# Patient Record
Sex: FEMALE | Race: WHITE | Employment: FULL TIME | URBAN - METROPOLITAN AREA
[De-identification: names, ages, dates, MRNs, and addresses within clinical notes are randomized per-mention and may not be internally consistent; named-entity substitution may affect disease eponyms.]

---

## 2017-03-06 RX ORDER — ZOLPIDEM TARTRATE 10 MG/1
10 TABLET ORAL NIGHTLY PRN
Qty: 90 TABLET | Refills: 1 | Status: CANCELLED | OUTPATIENT
Start: 2017-03-06

## 2017-03-17 RX ORDER — ZOLPIDEM TARTRATE 10 MG/1
10 TABLET ORAL NIGHTLY PRN
Qty: 90 TABLET | Refills: 1 | Status: SHIPPED | OUTPATIENT
Start: 2017-03-17 | End: 2018-06-25 | Stop reason: SDUPTHER

## 2017-08-07 RX ORDER — LEVOTHYROXINE SODIUM 137 UG/1
TABLET ORAL
Qty: 90 TABLET | Refills: 1 | Status: SHIPPED | OUTPATIENT
Start: 2017-08-07 | End: 2018-02-03 | Stop reason: SDUPTHER

## 2017-09-22 ENCOUNTER — OFFICE VISIT (OUTPATIENT)
Dept: INTERNAL MEDICINE CLINIC | Age: 47
End: 2017-09-22

## 2017-09-22 VITALS
HEART RATE: 76 BPM | BODY MASS INDEX: 24.04 KG/M2 | WEIGHT: 153.2 LBS | RESPIRATION RATE: 16 BRPM | DIASTOLIC BLOOD PRESSURE: 60 MMHG | HEIGHT: 67 IN | SYSTOLIC BLOOD PRESSURE: 100 MMHG

## 2017-09-22 DIAGNOSIS — E03.4 HYPOTHYROIDISM DUE TO ACQUIRED ATROPHY OF THYROID: ICD-10-CM

## 2017-09-22 DIAGNOSIS — E78.00 PURE HYPERCHOLESTEROLEMIA: ICD-10-CM

## 2017-09-22 DIAGNOSIS — K90.0 CELIAC SPRUE: ICD-10-CM

## 2017-09-22 DIAGNOSIS — Z00.00 ANNUAL PHYSICAL EXAM: Primary | ICD-10-CM

## 2017-09-22 DIAGNOSIS — Z23 NEED FOR INFLUENZA VACCINATION: ICD-10-CM

## 2017-09-22 LAB
A/G RATIO: 1.4 (ref 1.1–2.2)
ALBUMIN SERPL-MCNC: 4.1 G/DL (ref 3.4–5)
ALP BLD-CCNC: 104 U/L (ref 40–129)
ALT SERPL-CCNC: 36 U/L (ref 10–40)
ANION GAP SERPL CALCULATED.3IONS-SCNC: 13 MMOL/L (ref 3–16)
AST SERPL-CCNC: 24 U/L (ref 15–37)
BASOPHILS ABSOLUTE: 0 K/UL (ref 0–0.2)
BASOPHILS RELATIVE PERCENT: 0.4 %
BILIRUB SERPL-MCNC: 0.3 MG/DL (ref 0–1)
BUN BLDV-MCNC: 12 MG/DL (ref 7–20)
CALCIUM SERPL-MCNC: 9 MG/DL (ref 8.3–10.6)
CHLORIDE BLD-SCNC: 99 MMOL/L (ref 99–110)
CHOLESTEROL, TOTAL: 231 MG/DL (ref 0–199)
CO2: 27 MMOL/L (ref 21–32)
CREAT SERPL-MCNC: <0.5 MG/DL (ref 0.6–1.1)
EOSINOPHILS ABSOLUTE: 0.1 K/UL (ref 0–0.6)
EOSINOPHILS RELATIVE PERCENT: 1.6 %
FERRITIN: 143.3 NG/ML (ref 15–150)
GFR AFRICAN AMERICAN: >60
GFR NON-AFRICAN AMERICAN: >60
GLOBULIN: 2.9 G/DL
GLUCOSE BLD-MCNC: 83 MG/DL (ref 70–99)
HCT VFR BLD CALC: 40.1 % (ref 36–48)
HDLC SERPL-MCNC: 58 MG/DL (ref 40–60)
HEMOGLOBIN: 13.6 G/DL (ref 12–16)
IRON SATURATION: 33 % (ref 15–50)
IRON: 104 UG/DL (ref 37–145)
LDL CHOLESTEROL CALCULATED: 141 MG/DL
LYMPHOCYTES ABSOLUTE: 1.8 K/UL (ref 1–5.1)
LYMPHOCYTES RELATIVE PERCENT: 21.1 %
MCH RBC QN AUTO: 31.8 PG (ref 26–34)
MCHC RBC AUTO-ENTMCNC: 34 G/DL (ref 31–36)
MCV RBC AUTO: 93.5 FL (ref 80–100)
MONOCYTES ABSOLUTE: 0.5 K/UL (ref 0–1.3)
MONOCYTES RELATIVE PERCENT: 6 %
NEUTROPHILS ABSOLUTE: 5.9 K/UL (ref 1.7–7.7)
NEUTROPHILS RELATIVE PERCENT: 70.9 %
PDW BLD-RTO: 13.3 % (ref 12.4–15.4)
PLATELET # BLD: 241 K/UL (ref 135–450)
PMV BLD AUTO: 7.9 FL (ref 5–10.5)
POTASSIUM SERPL-SCNC: 4.3 MMOL/L (ref 3.5–5.1)
RBC # BLD: 4.28 M/UL (ref 4–5.2)
SODIUM BLD-SCNC: 139 MMOL/L (ref 136–145)
TOTAL IRON BINDING CAPACITY: 319 UG/DL (ref 260–445)
TOTAL PROTEIN: 7 G/DL (ref 6.4–8.2)
TRIGL SERPL-MCNC: 162 MG/DL (ref 0–150)
TSH SERPL DL<=0.05 MIU/L-ACNC: 3.4 UIU/ML (ref 0.27–4.2)
VITAMIN D 25-HYDROXY: 31.8 NG/ML
VLDLC SERPL CALC-MCNC: 32 MG/DL
WBC # BLD: 8.4 K/UL (ref 4–11)

## 2017-09-22 PROCEDURE — 99396 PREV VISIT EST AGE 40-64: CPT | Performed by: INTERNAL MEDICINE

## 2017-09-22 PROCEDURE — 90471 IMMUNIZATION ADMIN: CPT | Performed by: INTERNAL MEDICINE

## 2017-09-22 PROCEDURE — 90686 IIV4 VACC NO PRSV 0.5 ML IM: CPT | Performed by: INTERNAL MEDICINE

## 2017-09-22 ASSESSMENT — PATIENT HEALTH QUESTIONNAIRE - PHQ9
2. FEELING DOWN, DEPRESSED OR HOPELESS: 0
SUM OF ALL RESPONSES TO PHQ9 QUESTIONS 1 & 2: 0
SUM OF ALL RESPONSES TO PHQ QUESTIONS 1-9: 0
1. LITTLE INTEREST OR PLEASURE IN DOING THINGS: 0

## 2017-09-22 ASSESSMENT — ENCOUNTER SYMPTOMS
BLOOD IN STOOL: 0
RESPIRATORY NEGATIVE: 1
WHEEZING: 0
SHORTNESS OF BREATH: 0

## 2017-09-25 LAB — CELIAC PANEL: 19 UNITS (ref 0–19)

## 2018-02-03 RX ORDER — LEVOTHYROXINE SODIUM 137 UG/1
TABLET ORAL
Qty: 90 TABLET | Refills: 1 | Status: SHIPPED | OUTPATIENT
Start: 2018-02-03 | End: 2018-08-02 | Stop reason: SDUPTHER

## 2018-06-25 DIAGNOSIS — G47.00 INSOMNIA, UNSPECIFIED TYPE: Primary | ICD-10-CM

## 2018-06-25 RX ORDER — ZOLPIDEM TARTRATE 10 MG/1
10 TABLET ORAL NIGHTLY PRN
Qty: 90 TABLET | Refills: 0 | Status: SHIPPED | OUTPATIENT
Start: 2018-06-25 | End: 2018-06-26 | Stop reason: SDUPTHER

## 2018-06-26 DIAGNOSIS — G47.00 INSOMNIA, UNSPECIFIED TYPE: ICD-10-CM

## 2018-06-26 RX ORDER — ZOLPIDEM TARTRATE 10 MG/1
TABLET ORAL
Qty: 14 TABLET | Refills: 0 | Status: SHIPPED | OUTPATIENT
Start: 2018-06-26 | End: 2018-10-11 | Stop reason: SDUPTHER

## 2018-06-26 RX ORDER — ZOLPIDEM TARTRATE 10 MG/1
10 TABLET ORAL NIGHTLY PRN
Qty: 14 TABLET | Refills: 0 | Status: CANCELLED | OUTPATIENT
Start: 2018-06-26

## 2018-07-18 RX ORDER — CITALOPRAM 20 MG/1
TABLET ORAL
Qty: 90 TABLET | Refills: 5 | Status: SHIPPED | OUTPATIENT
Start: 2018-07-18 | End: 2019-10-13 | Stop reason: SDUPTHER

## 2018-08-02 RX ORDER — LEVOTHYROXINE SODIUM 137 UG/1
TABLET ORAL
Qty: 90 TABLET | Refills: 1 | Status: SHIPPED | OUTPATIENT
Start: 2018-08-02 | End: 2018-09-24 | Stop reason: SDUPTHER

## 2018-09-21 ENCOUNTER — OFFICE VISIT (OUTPATIENT)
Dept: INTERNAL MEDICINE CLINIC | Age: 48
End: 2018-09-21

## 2018-09-21 VITALS
RESPIRATION RATE: 16 BRPM | SYSTOLIC BLOOD PRESSURE: 102 MMHG | DIASTOLIC BLOOD PRESSURE: 60 MMHG | HEART RATE: 98 BPM | BODY MASS INDEX: 21.06 KG/M2 | HEIGHT: 67 IN | WEIGHT: 134.2 LBS

## 2018-09-21 DIAGNOSIS — E03.4 HYPOTHYROIDISM DUE TO ACQUIRED ATROPHY OF THYROID: ICD-10-CM

## 2018-09-21 DIAGNOSIS — Z23 NEED FOR INFLUENZA VACCINATION: ICD-10-CM

## 2018-09-21 DIAGNOSIS — F41.9 ANXIETY: ICD-10-CM

## 2018-09-21 DIAGNOSIS — K90.0 CELIAC SPRUE: ICD-10-CM

## 2018-09-21 DIAGNOSIS — Z00.00 ANNUAL PHYSICAL EXAM: Primary | ICD-10-CM

## 2018-09-21 LAB
A/G RATIO: 1.9 (ref 1.1–2.2)
ALBUMIN SERPL-MCNC: 4.5 G/DL (ref 3.4–5)
ALP BLD-CCNC: 110 U/L (ref 40–129)
ALT SERPL-CCNC: 13 U/L (ref 10–40)
ANION GAP SERPL CALCULATED.3IONS-SCNC: 13 MMOL/L (ref 3–16)
AST SERPL-CCNC: 15 U/L (ref 15–37)
BASOPHILS ABSOLUTE: 0 K/UL (ref 0–0.2)
BASOPHILS RELATIVE PERCENT: 0.4 %
BILIRUB SERPL-MCNC: 0.3 MG/DL (ref 0–1)
BUN BLDV-MCNC: 16 MG/DL (ref 7–20)
CALCIUM SERPL-MCNC: 9.4 MG/DL (ref 8.3–10.6)
CHLORIDE BLD-SCNC: 99 MMOL/L (ref 99–110)
CHOLESTEROL, TOTAL: 228 MG/DL (ref 0–199)
CO2: 28 MMOL/L (ref 21–32)
CREAT SERPL-MCNC: <0.5 MG/DL (ref 0.6–1.1)
EOSINOPHILS ABSOLUTE: 0.2 K/UL (ref 0–0.6)
EOSINOPHILS RELATIVE PERCENT: 2.4 %
GFR AFRICAN AMERICAN: >60
GFR NON-AFRICAN AMERICAN: >60
GLOBULIN: 2.4 G/DL
GLUCOSE BLD-MCNC: 85 MG/DL (ref 70–99)
HCT VFR BLD CALC: 39.6 % (ref 36–48)
HDLC SERPL-MCNC: 56 MG/DL (ref 40–60)
HEMOGLOBIN: 13.4 G/DL (ref 12–16)
LDL CHOLESTEROL CALCULATED: 135 MG/DL
LYMPHOCYTES ABSOLUTE: 1.8 K/UL (ref 1–5.1)
LYMPHOCYTES RELATIVE PERCENT: 27.2 %
MCH RBC QN AUTO: 31.8 PG (ref 26–34)
MCHC RBC AUTO-ENTMCNC: 33.8 G/DL (ref 31–36)
MCV RBC AUTO: 94.3 FL (ref 80–100)
MONOCYTES ABSOLUTE: 0.4 K/UL (ref 0–1.3)
MONOCYTES RELATIVE PERCENT: 6.4 %
NEUTROPHILS ABSOLUTE: 4.1 K/UL (ref 1.7–7.7)
NEUTROPHILS RELATIVE PERCENT: 63.6 %
PDW BLD-RTO: 13 % (ref 12.4–15.4)
PLATELET # BLD: 236 K/UL (ref 135–450)
PMV BLD AUTO: 8 FL (ref 5–10.5)
POTASSIUM SERPL-SCNC: 4.2 MMOL/L (ref 3.5–5.1)
RBC # BLD: 4.2 M/UL (ref 4–5.2)
SODIUM BLD-SCNC: 140 MMOL/L (ref 136–145)
TOTAL PROTEIN: 6.9 G/DL (ref 6.4–8.2)
TRIGL SERPL-MCNC: 186 MG/DL (ref 0–150)
TSH SERPL DL<=0.05 MIU/L-ACNC: 5.47 UIU/ML (ref 0.27–4.2)
VITAMIN B-12: 506 PG/ML (ref 211–911)
VITAMIN D 25-HYDROXY: 25.3 NG/ML
VLDLC SERPL CALC-MCNC: 37 MG/DL
WBC # BLD: 6.4 K/UL (ref 4–11)

## 2018-09-21 PROCEDURE — 99396 PREV VISIT EST AGE 40-64: CPT | Performed by: INTERNAL MEDICINE

## 2018-09-21 PROCEDURE — 90682 RIV4 VACC RECOMBINANT DNA IM: CPT | Performed by: INTERNAL MEDICINE

## 2018-09-21 PROCEDURE — 90471 IMMUNIZATION ADMIN: CPT | Performed by: INTERNAL MEDICINE

## 2018-09-21 ASSESSMENT — ENCOUNTER SYMPTOMS
RESPIRATORY NEGATIVE: 1
GASTROINTESTINAL NEGATIVE: 1
SHORTNESS OF BREATH: 0
ABDOMINAL DISTENTION: 0
BLOOD IN STOOL: 0
WHEEZING: 0

## 2018-09-21 ASSESSMENT — PATIENT HEALTH QUESTIONNAIRE - PHQ9
2. FEELING DOWN, DEPRESSED OR HOPELESS: 0
SUM OF ALL RESPONSES TO PHQ QUESTIONS 1-9: 0
SUM OF ALL RESPONSES TO PHQ9 QUESTIONS 1 & 2: 0
1. LITTLE INTEREST OR PLEASURE IN DOING THINGS: 0
SUM OF ALL RESPONSES TO PHQ QUESTIONS 1-9: 0

## 2018-09-21 NOTE — PROGRESS NOTES
Vaccine Information Sheet, \"Influenza - Inactivated\"  given to Gato Doan, or parent/legal guardian of  Gato Doan and verbalized understanding. Patient responses:    Have you ever had a reaction to a flu vaccine? No  Are you able to eat eggs without adverse effects? Yes  Do you have any current illness? No  Have you ever had Guillian Starlight Syndrome? No    Flu vaccine given per order. Please see immunization tab.

## 2018-09-21 NOTE — PROGRESS NOTES
Subjective:      Patient ID: Marietta Laughlin is a 41 y.o. female. HPI  Here for an annual exam.  She has been doing well. Exercising regularly, walks and feels well with that. Sleep continues to be a problem. Taking ambien with some relief, still with early waking. She has lost 20 pounds since he last visit. Energy is ok. No changes in her hair or nails. Review of Systems   Constitutional: Negative for fatigue and unexpected weight change. Respiratory: Negative. Negative for shortness of breath and wheezing. Cardiovascular: Negative. Negative for chest pain and palpitations. Gastrointestinal: Negative. Negative for abdominal distention and blood in stool. Genitourinary: Negative. Negative for hematuria. Psychiatric/Behavioral: Negative for dysphoric mood. All other systems reviewed and are negative. Current Outpatient Prescriptions   Medication Sig Dispense Refill    levothyroxine (SYNTHROID) 137 MCG tablet TAKE 1 TABLET DAILY 90 tablet 1    citalopram (CELEXA) 20 MG tablet TAKE 1 TABLET DAILY 90 tablet 5    zolpidem (AMBIEN) 10 MG tablet One at night. 14 tablet 0    estradiol (ESTRACE) 2 MG tablet Take 2 mg by mouth daily       No current facility-administered medications for this visit.         Allergies:  Demerol      Past Medical History:   Diagnosis Date    Anemia     in the past    Celiac sprue     Hypothyroidism     Insomnia          Past Surgical History:   Procedure Laterality Date    ABDOMINOPLASTY  10/2010    APPENDECTOMY      at the timeof the hysterectomy    CARPAL TUNNEL RELEASE Right 13    CARPAL TUNNEL RELEASE Left 10/11/13     SECTION      x2    COLONOSCOPY  10/27/14    gallagher-moderate distal descending diverticulosis, repeat in 10 years    HYSTERECTOMY      total hysterectomy (family history of ovarian cancer)         Social History   Substance Use Topics    Smoking status: Former Smoker     Quit date: 2006    Smokeless tobacco:

## 2018-09-25 LAB — CELIAC PANEL: 12 UNITS (ref 0–19)

## 2019-10-30 ENCOUNTER — ANESTHESIA EVENT (OUTPATIENT)
Dept: ENDOSCOPY | Age: 49
End: 2019-10-30
Payer: COMMERCIAL

## 2019-11-01 ENCOUNTER — ANESTHESIA (OUTPATIENT)
Dept: ENDOSCOPY | Age: 49
End: 2019-11-01
Payer: COMMERCIAL

## 2019-11-01 ENCOUNTER — HOSPITAL ENCOUNTER (OUTPATIENT)
Age: 49
Setting detail: OUTPATIENT SURGERY
Discharge: HOME | End: 2019-11-01
Attending: INTERNAL MEDICINE | Admitting: INTERNAL MEDICINE
Payer: COMMERCIAL

## 2019-11-01 VITALS
OXYGEN SATURATION: 100 % | BODY MASS INDEX: 21.41 KG/M2 | RESPIRATION RATE: 16 BRPM | HEIGHT: 67 IN | HEART RATE: 69 BPM | WEIGHT: 136.4 LBS | DIASTOLIC BLOOD PRESSURE: 87 MMHG | TEMPERATURE: 97 F | SYSTOLIC BLOOD PRESSURE: 138 MMHG

## 2019-11-01 VITALS — OXYGEN SATURATION: 100 % | SYSTOLIC BLOOD PRESSURE: 121 MMHG | DIASTOLIC BLOOD PRESSURE: 87 MMHG

## 2019-11-01 DIAGNOSIS — Z83.71 FAMILY HISTORY OF COLONIC POLYPS: ICD-10-CM

## 2019-11-01 PROCEDURE — 2500000003 HC RX 250 WO HCPCS: Performed by: NURSE ANESTHETIST, CERTIFIED REGISTERED

## 2019-11-01 PROCEDURE — 3700000000 HC ANESTHESIA ATTENDED CARE: Performed by: INTERNAL MEDICINE

## 2019-11-01 PROCEDURE — 7100000011 HC PHASE II RECOVERY - ADDTL 15 MIN: Performed by: INTERNAL MEDICINE

## 2019-11-01 PROCEDURE — 7100000010 HC PHASE II RECOVERY - FIRST 15 MIN: Performed by: INTERNAL MEDICINE

## 2019-11-01 PROCEDURE — 2709999900 HC NON-CHARGEABLE SUPPLY: Performed by: INTERNAL MEDICINE

## 2019-11-01 PROCEDURE — 3700000001 HC ADD 15 MINUTES (ANESTHESIA): Performed by: INTERNAL MEDICINE

## 2019-11-01 PROCEDURE — 3609010600 HC COLONOSCOPY POLYPECTOMY SNARE/COLD BIOPSY: Performed by: INTERNAL MEDICINE

## 2019-11-01 PROCEDURE — 6360000002 HC RX W HCPCS: Performed by: NURSE ANESTHETIST, CERTIFIED REGISTERED

## 2019-11-01 PROCEDURE — 2580000003 HC RX 258: Performed by: ANESTHESIOLOGY

## 2019-11-01 PROCEDURE — 88305 TISSUE EXAM BY PATHOLOGIST: CPT

## 2019-11-01 RX ORDER — PROPOFOL 10 MG/ML
INJECTION, EMULSION INTRAVENOUS CONTINUOUS PRN
Status: DISCONTINUED | OUTPATIENT
Start: 2019-11-01 | End: 2019-11-01 | Stop reason: SDUPTHER

## 2019-11-01 RX ORDER — SODIUM CHLORIDE 9 MG/ML
INJECTION, SOLUTION INTRAVENOUS CONTINUOUS
Status: DISCONTINUED | OUTPATIENT
Start: 2019-11-01 | End: 2019-11-01 | Stop reason: HOSPADM

## 2019-11-01 RX ORDER — SODIUM CHLORIDE 0.9 % (FLUSH) 0.9 %
10 SYRINGE (ML) INJECTION EVERY 12 HOURS SCHEDULED
Status: DISCONTINUED | OUTPATIENT
Start: 2019-11-01 | End: 2019-11-01 | Stop reason: HOSPADM

## 2019-11-01 RX ORDER — PROPOFOL 10 MG/ML
INJECTION, EMULSION INTRAVENOUS PRN
Status: DISCONTINUED | OUTPATIENT
Start: 2019-11-01 | End: 2019-11-01 | Stop reason: SDUPTHER

## 2019-11-01 RX ORDER — SODIUM CHLORIDE 0.9 % (FLUSH) 0.9 %
10 SYRINGE (ML) INJECTION PRN
Status: DISCONTINUED | OUTPATIENT
Start: 2019-11-01 | End: 2019-11-01 | Stop reason: HOSPADM

## 2019-11-01 RX ORDER — LIDOCAINE HYDROCHLORIDE 20 MG/ML
INJECTION, SOLUTION INFILTRATION; PERINEURAL PRN
Status: DISCONTINUED | OUTPATIENT
Start: 2019-11-01 | End: 2019-11-01 | Stop reason: SDUPTHER

## 2019-11-01 RX ADMIN — PROPOFOL 20 MG: 10 INJECTION, EMULSION INTRAVENOUS at 07:35

## 2019-11-01 RX ADMIN — SODIUM CHLORIDE: 0.9 INJECTION, SOLUTION INTRAVENOUS at 06:54

## 2019-11-01 RX ADMIN — LIDOCAINE HYDROCHLORIDE 50 MG: 20 INJECTION, SOLUTION INFILTRATION; PERINEURAL at 07:32

## 2019-11-01 RX ADMIN — PROPOFOL 120 MCG/KG/MIN: 10 INJECTION, EMULSION INTRAVENOUS at 07:32

## 2019-11-01 ASSESSMENT — PAIN SCALES - GENERAL
PAINLEVEL_OUTOF10: 0

## 2019-11-01 ASSESSMENT — LIFESTYLE VARIABLES: SMOKING_STATUS: 0

## 2019-11-01 ASSESSMENT — PAIN - FUNCTIONAL ASSESSMENT: PAIN_FUNCTIONAL_ASSESSMENT: 0-10

## 2020-11-16 ENCOUNTER — OFFICE VISIT (OUTPATIENT)
Dept: PRIMARY CARE CLINIC | Age: 50
End: 2020-11-16
Payer: COMMERCIAL

## 2020-11-16 PROCEDURE — 99211 OFF/OP EST MAY X REQ PHY/QHP: CPT | Performed by: NURSE PRACTITIONER

## 2020-11-16 NOTE — PROGRESS NOTES
[id number]  time_11/20/2020 0630___________        Surgery time______0730______    Take the following medications with a sip of water:    Do not eat or drink anything after 12:00 midnight prior to your surgery. This includes water chewing gum, mints and ice chips. You may brush your teeth and gargle the morning of your surgery, but do not swallow the water     Please see your family doctor/pediatrician for a history and physical and/or concerning medications. Bring any test results/reports from your physicians office. If you are under the care of a heart doctor or specialist doctor, please be aware that you may be asked to them for clearance    You may be asked to stop blood thinners such as Coumadin, Plavix, Fragmin, Lovenox, etc., or any anti-inflammatories such as:  Aspirin, Ibuprofen, Advil, Naproxen prior to your surgery. We also ask that you stop any OTC medications such as fish oil, vitamin E, glucosamine, garlic, Multivitamins, COQ 10, etc.    We ask that you do not smoke 24 hours prior to surgery  We ask that you do not  drink any alcoholic beverages 24 hours prior to surgery     You must make arrangements for a responsible adult to take you home after your surgery. For your safety you will not be allowed to leave alone or drive yourself home. Your surgery will be cancelled if you do not have a ride home. Also for your safety, it is strongly suggested that someone stay with you the first 24 hours after your surgery. A parent or legal guardian must accompany a child scheduled for surgery and plan to stay at the hospital until the child is discharged. Please do not bring other children with you. For your comfort, please wear simple loose fitting clothing to the hospital.  Please do not bring valuables.     Do not wear any make-up or nail polish on your fingers or toes      For your safety, please do not wear any jewelry or body piercing's on the day of surgery. All jewelry must be removed. If you have dentures, they will be removed before going to operating room. For your convenience, we will provide you with a container. If you wear contact lenses or glasses, they will be removed, please bring a case for them. If you have a living will and a durable power of  for healthcare, please bring in a copy. As part of our patient safety program to minimize surgical site infections, we ask you to do the following:    · Please notify your surgeon if you develop any illness between         now and the  day of your surgery. · This includes a cough, cold, fever, sore throat, nausea,         or vomiting, and diarrhea, etc.  ·  Please notify your surgeon if you experience dizziness, shortness         of breath or blurred vision between now and the time of your surgery. Do not shave your operative site 96 hours prior to surgery. For face and neck surgery, men may use an electric razor 48 hours   prior to surgery. You may shower the night before surgery or the morning of   your surgery with an antibacterial soap. You will need to bring a photo ID and insurance card    [facility] has an onsite pharmacy, would you like to utilize our pharmacy     If you will be staying overnight and use a C-pap machine, please bring   your C-pap to hospital     Our goal is to provide you with excellent care, therefore, visitors will be limited to two(2) in the room at a time so that we may focus on providing this care for you. Please contact pre-admission testing if you have any further questions. [facility] phone number:  [phone]  Please note these are generalized instructions for all surgical cases, you may be provided with more specific instructions according to your surgery.

## 2020-11-18 ENCOUNTER — ANESTHESIA EVENT (OUTPATIENT)
Dept: ENDOSCOPY | Age: 50
End: 2020-11-18
Payer: COMMERCIAL

## 2020-11-18 LAB — SARS-COV-2: NORMAL

## 2020-11-20 ENCOUNTER — HOSPITAL ENCOUNTER (OUTPATIENT)
Age: 50
Setting detail: OUTPATIENT SURGERY
Discharge: HOME | End: 2020-11-20
Attending: INTERNAL MEDICINE | Admitting: INTERNAL MEDICINE
Payer: COMMERCIAL

## 2020-11-20 ENCOUNTER — ANESTHESIA (OUTPATIENT)
Dept: ENDOSCOPY | Age: 50
End: 2020-11-20
Payer: COMMERCIAL

## 2020-11-20 VITALS — DIASTOLIC BLOOD PRESSURE: 101 MMHG | OXYGEN SATURATION: 97 % | SYSTOLIC BLOOD PRESSURE: 147 MMHG

## 2020-11-20 VITALS
RESPIRATION RATE: 16 BRPM | TEMPERATURE: 96.9 F | WEIGHT: 145 LBS | OXYGEN SATURATION: 99 % | SYSTOLIC BLOOD PRESSURE: 139 MMHG | HEIGHT: 67 IN | BODY MASS INDEX: 22.76 KG/M2 | HEART RATE: 65 BPM | DIASTOLIC BLOOD PRESSURE: 77 MMHG

## 2020-11-20 LAB — SARS-COV-2, NAAT: NOT DETECTED

## 2020-11-20 PROCEDURE — 2580000003 HC RX 258: Performed by: NURSE ANESTHETIST, CERTIFIED REGISTERED

## 2020-11-20 PROCEDURE — 3700000001 HC ADD 15 MINUTES (ANESTHESIA): Performed by: INTERNAL MEDICINE

## 2020-11-20 PROCEDURE — 2580000003 HC RX 258: Performed by: ANESTHESIOLOGY

## 2020-11-20 PROCEDURE — 7100000011 HC PHASE II RECOVERY - ADDTL 15 MIN: Performed by: INTERNAL MEDICINE

## 2020-11-20 PROCEDURE — 6360000002 HC RX W HCPCS: Performed by: NURSE ANESTHETIST, CERTIFIED REGISTERED

## 2020-11-20 PROCEDURE — 2709999900 HC NON-CHARGEABLE SUPPLY: Performed by: INTERNAL MEDICINE

## 2020-11-20 PROCEDURE — 88305 TISSUE EXAM BY PATHOLOGIST: CPT

## 2020-11-20 PROCEDURE — 3609012400 HC EGD TRANSORAL BIOPSY SINGLE/MULTIPLE: Performed by: INTERNAL MEDICINE

## 2020-11-20 PROCEDURE — U0002 COVID-19 LAB TEST NON-CDC: HCPCS

## 2020-11-20 PROCEDURE — 2500000003 HC RX 250 WO HCPCS: Performed by: NURSE ANESTHETIST, CERTIFIED REGISTERED

## 2020-11-20 PROCEDURE — 7100000010 HC PHASE II RECOVERY - FIRST 15 MIN: Performed by: INTERNAL MEDICINE

## 2020-11-20 PROCEDURE — 3700000000 HC ANESTHESIA ATTENDED CARE: Performed by: INTERNAL MEDICINE

## 2020-11-20 RX ORDER — SODIUM CHLORIDE 9 MG/ML
INJECTION, SOLUTION INTRAVENOUS CONTINUOUS PRN
Status: DISCONTINUED | OUTPATIENT
Start: 2020-11-20 | End: 2020-11-20 | Stop reason: SDUPTHER

## 2020-11-20 RX ORDER — SODIUM CHLORIDE 0.9 % (FLUSH) 0.9 %
10 SYRINGE (ML) INJECTION EVERY 12 HOURS SCHEDULED
Status: DISCONTINUED | OUTPATIENT
Start: 2020-11-20 | End: 2020-11-20 | Stop reason: HOSPADM

## 2020-11-20 RX ORDER — SODIUM CHLORIDE 9 MG/ML
INJECTION, SOLUTION INTRAVENOUS CONTINUOUS
Status: DISCONTINUED | OUTPATIENT
Start: 2020-11-20 | End: 2020-11-20 | Stop reason: HOSPADM

## 2020-11-20 RX ORDER — LIDOCAINE HYDROCHLORIDE 20 MG/ML
INJECTION, SOLUTION EPIDURAL; INFILTRATION; INTRACAUDAL; PERINEURAL PRN
Status: DISCONTINUED | OUTPATIENT
Start: 2020-11-20 | End: 2020-11-20 | Stop reason: SDUPTHER

## 2020-11-20 RX ORDER — PROPOFOL 10 MG/ML
INJECTION, EMULSION INTRAVENOUS PRN
Status: DISCONTINUED | OUTPATIENT
Start: 2020-11-20 | End: 2020-11-20 | Stop reason: SDUPTHER

## 2020-11-20 RX ORDER — SODIUM CHLORIDE 0.9 % (FLUSH) 0.9 %
10 SYRINGE (ML) INJECTION PRN
Status: DISCONTINUED | OUTPATIENT
Start: 2020-11-20 | End: 2020-11-20 | Stop reason: HOSPADM

## 2020-11-20 RX ORDER — GLYCOPYRROLATE 0.2 MG/ML
INJECTION INTRAMUSCULAR; INTRAVENOUS PRN
Status: DISCONTINUED | OUTPATIENT
Start: 2020-11-20 | End: 2020-11-20 | Stop reason: SDUPTHER

## 2020-11-20 RX ADMIN — SODIUM CHLORIDE: 9 INJECTION, SOLUTION INTRAVENOUS at 08:54

## 2020-11-20 RX ADMIN — PROPOFOL 50 MG: 10 INJECTION, EMULSION INTRAVENOUS at 09:05

## 2020-11-20 RX ADMIN — PROPOFOL 100 MG: 10 INJECTION, EMULSION INTRAVENOUS at 09:00

## 2020-11-20 RX ADMIN — SODIUM CHLORIDE: 9 INJECTION, SOLUTION INTRAVENOUS at 08:35

## 2020-11-20 RX ADMIN — PROPOFOL 50 MG: 10 INJECTION, EMULSION INTRAVENOUS at 09:02

## 2020-11-20 RX ADMIN — GLYCOPYRROLATE 0.2 MG: 0.2 INJECTION, SOLUTION INTRAMUSCULAR; INTRAVENOUS at 08:54

## 2020-11-20 RX ADMIN — LIDOCAINE HYDROCHLORIDE 40 MG: 20 INJECTION, SOLUTION EPIDURAL; INFILTRATION; INTRACAUDAL; PERINEURAL at 09:00

## 2020-11-20 RX ADMIN — PROPOFOL 50 MG: 10 INJECTION, EMULSION INTRAVENOUS at 09:03

## 2020-11-20 ASSESSMENT — PAIN SCALES - GENERAL
PAINLEVEL_OUTOF10: 0

## 2020-11-20 ASSESSMENT — ENCOUNTER SYMPTOMS: SHORTNESS OF BREATH: 0

## 2020-11-20 ASSESSMENT — PAIN - FUNCTIONAL ASSESSMENT: PAIN_FUNCTIONAL_ASSESSMENT: 0-10

## 2020-11-20 NOTE — ANESTHESIA POSTPROCEDURE EVALUATION
Department of Anesthesiology  Postprocedure Note    Patient: Luiz Miner  MRN: [id number]  YOB: 1970  Date of evaluation: 11/20/2020  Time:  10:11 AM     Procedure Summary     Date:  11/20/20 Room / Location:  [address] [place] [address]    Anesthesia Start:  5034 Anesthesia Stop:  8336    Procedure:  EGD BIOPSY (N/A ) Diagnosis:       Gastroesophageal reflux disease, unspecified whether esophagitis present      (GERD)    Surgeon:  Mayelin Aviles MD Responsible Provider:  Mark Gunn MD    Anesthesia Type:  MAC ASA Status:  2          Anesthesia Type: MAC    Bakari Phase I: Bakari Score: 10    Bakari Phase II: Bakari Score: 10    Last vitals: Reviewed and per EMR flowsheets.        Anesthesia Post Evaluation    Patient location during evaluation: PACU  Level of consciousness: awake and alert  Airway patency: patent  Nausea & Vomiting: no nausea and no vomiting  Complications: no  Cardiovascular status: blood pressure returned to baseline  Respiratory status: acceptable  Hydration status: euvolemic  Comments: Postoperative Anesthesia Note    Name:    Luiz Miner  MRN:      [id number]    Patient Vitals in the past 12 hrs:  11/20/20 0929, BP:139/77, Pulse:65, Resp:16, SpO2:99 %  11/20/20 0921, BP:125/86, Pulse:75, Resp:16, SpO2:99 %  11/20/20 0912, BP:119/76, Temp:96.9 °F (36.1 °C), Temp src:Temporal, Pulse:76, Resp:16, SpO2:97 %  11/20/20 0831, Temp:96.6 °F (35.9 °C), Temp src:Temporal, Pulse:71, Resp:16, SpO2:100 %, Height:5' 7\" (1.702 m), Weight:145 lb (65.8 kg)     LABS:    CBC  Lab Results       Component                Value               Date/Time                  WBC                      7.1                 09/11/2020 09:54 AM        HGB                      13.4                09/11/2020 09:54 AM        HCT                      39.2                09/11/2020 09:54 AM        PLT                      253                 09/11/2020 09:54 AM   RENAL  Lab Results Component                Value               Date/Time                  NA                       140                 09/11/2020 09:54 AM        K                        4.4                 09/11/2020 09:54 AM        CL                       101                 09/11/2020 09:54 AM        CO2                      27                  09/11/2020 09:54 AM        BUN                      16                  09/11/2020 09:54 AM        CREATININE               <0.5 (L)            09/11/2020 09:54 AM        GLUCOSE                  85                  09/11/2020 09:54 AM   COAGS  No results found for: PROTIME, INR, APTT    Intake & Output:  @24HRIO@    Nausea & Vomiting:  No    Level of Consciousness:  Awake    Pain Assessment:  Adequate analgesia    Anesthesia Complications:  No apparent anesthetic complications    SUMMARY      Vital signs stable  OK to discharge from Stage I post anesthesia care.   Care transferred from Anesthesiology department on discharge from perioperative area

## 2020-11-20 NOTE — ANESTHESIA PRE PROCEDURE
[facility] Department of Anesthesiology  Pre-Anesthesia Evaluation/Consultation       Name:  Juanjo Morenoster  : 1970  Age:  54 y.o. MRN:  [id number]  Date: 2020           Surgeon: Surgeon(s):  Christel Rose MD    Procedure: Procedure(s):  EGD ESOPHAGOGASTRODUODENOSCOPY     Allergies   Allergen Reactions    Demerol      Face rash    Meperidine Rash     Patient Active Problem List   Diagnosis    Celiac sprue    Hypothyroid    Hyperlipidemia    Insomnia    Anxiety    Carpal tunnel syndrome    Other tenosynovitis of hand and wrist     Past Medical History:   Diagnosis Date    Anemia     in the past    Celiac sprue 2003    Hypothyroidism     Insomnia      Past Surgical History:   Procedure Laterality Date    ABDOMINOPLASTY  10/2010    APPENDECTOMY      at the timeof the hysterectomy    CARPAL TUNNEL RELEASE Right 13    CARPAL TUNNEL RELEASE Left 10/11/13     SECTION      x2    COLONOSCOPY  10/27/2014    gallagher-moderate distal descending diverticulosis, repeat in 5 years    COLONOSCOPY N/A 2019    Gallagher- polyp x 1, repeat 5 years    HYSTERECTOMY      total hysterectomy (family history of ovarian cancer)     Social History     Tobacco Use    Smoking status: Former Smoker     Last attempt to quit: 2006     Years since quittin.4    Smokeless tobacco: Never Used   Substance Use Topics    Alcohol use: Yes     Comment: weekends    Drug use: No     Medications  No current facility-administered medications on file prior to encounter.       Current Outpatient Medications on File Prior to Encounter   Medication Sig Dispense Refill    famotidine (PEPCID) 20 MG tablet Take 1 tablet by mouth 2 times daily 60 tablet 5    levothyroxine (SYNTHROID) 150 MCG tablet TAKE 1 TABLET DAILY 90 tablet 4    citalopram (CELEXA) 20 MG tablet TAKE 1 TABLET DAILY 90 tablet 4    Cholecalciferol (VITAMIN D) 2000 units CAPS capsule Take by mouth daily      estradiol (ESTRACE) 2 MG tablet Take 2 mg by mouth daily       Current Facility-Administered Medications   Medication Dose Route Frequency Provider Last Rate Last Dose    0.9 % sodium chloride infusion   Intravenous Continuous Vahe Kamara MD        sodium chloride flush 0.9 % injection 10 mL  10 mL Intravenous 2 times per day Vahe Kamara MD        sodium chloride flush 0.9 % injection 10 mL  10 mL Intravenous PRN Vahe Kamara MD         Vital Signs (Current)   Vitals:    11/16/20 0819   Weight: 145 lb (65.8 kg)   Height: 5' 7\" (1.702 m)                                          BP Readings from Last 3 Encounters:   09/11/20 110/70   11/01/19 121/87   11/01/19 138/87     Vital Signs Statistics (for past 48 hrs)     No data recorded  BP Readings from Last 3 Encounters:   09/11/20 110/70   11/01/19 121/87   11/01/19 138/87       BMI  Body mass index is 22.71 kg/m². Estimated body mass index is 22.71 kg/m² as calculated from the following:    Height as of this encounter: 5' 7\" (1.702 m). Weight as of this encounter: 145 lb (65.8 kg).     CBC   Lab Results   Component Value Date    WBC 7.1 09/11/2020    RBC 4.15 09/11/2020    HGB 13.4 09/11/2020    HCT 39.2 09/11/2020    MCV 94.5 09/11/2020    RDW 13.8 09/11/2020     09/11/2020     CMP    Lab Results   Component Value Date     09/11/2020    K 4.4 09/11/2020     09/11/2020    CO2 27 09/11/2020    BUN 16 09/11/2020    CREATININE <0.5 09/11/2020    GFRAA >60 09/11/2020    GFRAA >60 05/24/2013    AGRATIO 1.7 09/11/2020    LABGLOM >60 09/11/2020    GLUCOSE 85 09/11/2020    PROT 6.8 09/11/2020    PROT 8.4 04/12/2010    CALCIUM 9.6 09/11/2020    BILITOT 0.3 09/11/2020    ALKPHOS 99 09/11/2020    AST 18 09/11/2020    ALT 21 09/11/2020     BMP    Lab Results   Component Value Date     09/11/2020    K 4.4 09/11/2020     09/11/2020    CO2 27 09/11/2020    BUN 16 09/11/2020    CREATININE <0.5 09/11/2020 CALCIUM 9.6 09/11/2020    GFRAA >60 09/11/2020    GFRAA >60 05/24/2013    LABGLOM >60 09/11/2020    GLUCOSE 85 09/11/2020     POCGlucose  No results for input(s): GLUCOSE in the last 72 hours. Coags  No results found for: PROTIME, INR, APTT  HCG (If Applicable) No results found for: PREGTESTUR, PREGSERUM, HCG, HCGQUANT   ABGs No results found for: PHART, PO2ART, LZP6SOZ, QNK5FTN, BEART, N5WEIZRN   Type & Screen (If Applicable)  No results found for: LABABO, LABRH                         BMI: Wt Readings from Last 3 Encounters:       NPO Status:                          Anesthesia Evaluation  Patient summary reviewed  Airway: Mallampati: III  TM distance: >3 FB   Neck ROM: full  Mouth opening: > = 3 FB Dental: normal exam         Pulmonary:       (-) COPD, asthma and shortness of breath                           Cardiovascular:    (+) hyperlipidemia    (-) hypertension, valvular problems/murmurs, past MI, CAD, CABG/stent, dysrhythmias and  angina                Neuro/Psych:   (+) neuromuscular disease:, depression/anxiety    (-) seizures, TIA and CVA           GI/Hepatic/Renal:        (-) GERD, PUD, liver disease and no renal disease       Endo/Other:    (+) hypothyroidism::., .    (-) diabetes mellitus               Abdominal:           Vascular: negative vascular ROS. Anesthesia Plan      MAC     ASA 2     (I discussed intravenous sedation to the patient's satisfaction including risks and alternatives. The patient agreed with the plan and has no further questions. KWADWO ALONSO )  Induction: intravenous. Anesthetic plan and risks discussed with patient. Plan discussed with CRNA. This pre-anesthesia assessment may be used as a history and physical.    DOS STAFF ADDENDUM:    Pt seen and examined, chart reviewed (including anesthesia, drug and allergy history). No interval changes to history and physical examination.   Anesthetic plan, risks, benefits, alternatives, and personnel involved discussed with patient. Patient verbalized an understanding and agrees to proceed.       Raine Segura MD  November 20, 2020  8:31 AM

## 2020-11-20 NOTE — H&P
[facility]   Pre-operative History and Physical    Patient: Kristy Jennings  : 1970  Acct#: [de-identified]    History Obtained From: electronic medical record    HISTORY OF PRESENT ILLNESS  Procedure:EGD  Indications:GERD  Past Medical History:        Diagnosis Date    Anemia     in the past    Celiac sprue     Hypothyroidism     Insomnia      Past Surgical History:        Procedure Laterality Date    ABDOMINOPLASTY  10/2010    APPENDECTOMY      at the timeof the hysterectomy    CARPAL TUNNEL RELEASE Right 13    CARPAL TUNNEL RELEASE Left 10/11/13     SECTION      x2    COLONOSCOPY  10/27/2014    gallagher-moderate distal descending diverticulosis, repeat in 5 years    COLONOSCOPY N/A 2019    Gallagher- polyp x 1, repeat 5 years    HYSTERECTOMY      total hysterectomy (family history of ovarian cancer)     Medications prior to admission:   Prior to Admission medications    Medication Sig Start Date End Date Taking?  Authorizing Provider   zolpidem (AMBIEN) 10 MG tablet TAKE 1 TABLET AT NIGHT 20 Yes Becka Yates MD   famotidine (PEPCID) 20 MG tablet Take 1 tablet by mouth 2 times daily 20  Yes Becka Yates MD   levothyroxine (SYNTHROID) 150 MCG tablet TAKE 1 TABLET DAILY 10/27/19  Yes Becka Yates MD   citalopram (CELEXA) 20 MG tablet TAKE 1 TABLET DAILY 10/14/19  Yes Becka Yates MD   Cholecalciferol (VITAMIN D) 2000 units CAPS capsule Take by mouth daily   Yes Historical Provider, MD   estradiol (ESTRACE) 2 MG tablet Take 2 mg by mouth daily   Yes Historical Provider, MD     Allergies:   Demerol and Meperidine    Social History     Socioeconomic History    Marital status:      Spouse name: Not on file    Number of children: 2    Years of education: Not on file    Highest education level: Not on file   Occupational History    Occupation: [employer]     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   Social Needs    Financial resource strain: Patient refused    Food insecurity     Worry: Patient refused     Inability: Patient refused    Transportation needs     Medical: Patient refused     Non-medical: Patient refused   Tobacco Use    Smoking status: Former Smoker     Last attempt to quit: 2006     Years since quittin.4    Smokeless tobacco: Never Used   Substance and Sexual Activity    Alcohol use: Yes     Comment: weekends    Drug use: No    Sexual activity: Yes     Partners: Male   Lifestyle    Physical activity     Days per week: Not on file     Minutes per session: Not on file    Stress: Not on file   Relationships    Social connections     Talks on phone: Not on file     Gets together: Not on file     Attends Hinduism service: Not on file     Active member of club or organization: Not on file     Attends meetings of clubs or organizations: Not on file     Relationship status: Not on file    Intimate partner violence     Fear of current or ex partner: Not on file     Emotionally abused: Not on file     Physically abused: Not on file     Forced sexual activity: Not on file   Other Topics Concern    Not on file   Social History Narrative    Not on file     Family History   Problem Relation Age of Onset    Mult Sclerosis Brother     Cancer Mother 61        Ovarian Cancer    Diabetes Mother     Colon Polyps Mother     Cancer Father         Prostate cancer    Colon Polyps Father          PHYSICAL EXAM:      Pulse 71   Temp 96.6 °F (35.9 °C) (Temporal)   Resp 16   Ht 5' 7\" (1.702 m)   Wt 145 lb (65.8 kg)   SpO2 100%   BMI 22.71 kg/m²  I        Heart:normal    Lungs: normal    Abdomen: normal      ASA Grade:  See anesthesia note      ASSESSMENT AND PLAN:    1. Procedure options, risks and benefits reviewed with patient and expresses understanding.

## 2020-11-20 NOTE — PROCEDURES
[facility]  Endoscopy Note    Patient: Dayna Pendleton  : 1970  Acct#: [de-identified]    Procedure: Esophagogastroduodenoscopy with biopsy    Date:  2020     Surgeon:  James Swan MD    Referring Physician:  Annabelle Sousa    Preoperative Diagnosis:  GERD    Postoperative Diagnosis:  Esophagitis, gastritis. Anesthesia: see anesthesia note. Indications: This is a 54y.o. year old female who presents today with New-onset dyspepsia. Description of Procedure:  Informed consent was obtained from the patient after explanation of indications, benefits and possible risks and complications of the procedure. The patient was then taken to the endoscopy suite, placed in the left lateral decubitus position and the above IV sedation was administrered. The Olympus videoendoscope was placed in the patient's mouth and under direct visualization passed into the esophagus. Visualization of the esophagus demonstrated erosive esophagitis. The distal esophagus was biopsied. .     The scope was then advanced into the stomach. Visualization of the gastric body and antrum demonstrated gastritis. The antrum was biopsied. .  A retroflexed exam of the gastric cardia and fundus demonstrated normal..  The pylorus was patent and the scope was advanced into the duodenum. Visualization of the duodenal bulb demonstrated normal..  The second portion of the duodenum demonstrated normal. The duodenum was biopsied. .    The scope was then withdrawn back into the stomach, it was decompressed, and the scope was completely withdrawn. The patient tolerated the procedure well and was taken to the post anesthesia care unit in good condition. Estimated Blood loss:  Minimal.    Impression: Erosive esophagitis and gastritis. Recommendations:Await pathology. PPI.     James Swan MD  [facility]

## 2021-02-17 ENCOUNTER — OFFICE VISIT (OUTPATIENT)
Dept: PRIMARY CARE CLINIC | Age: 51
End: 2021-02-17
Payer: COMMERCIAL

## 2021-02-17 DIAGNOSIS — Z20.822 SUSPECTED COVID-19 VIRUS INFECTION: Primary | ICD-10-CM

## 2021-02-17 PROCEDURE — 99211 OFF/OP EST MAY X REQ PHY/QHP: CPT | Performed by: NURSE PRACTITIONER

## 2021-02-18 LAB — SARS-COV-2, NAA: NOT DETECTED

## 2021-10-12 ENCOUNTER — OFFICE VISIT (OUTPATIENT)
Dept: SLEEP MEDICINE | Age: 51
End: 2021-10-12
Payer: COMMERCIAL

## 2021-10-12 VITALS
WEIGHT: 141 LBS | BODY MASS INDEX: 22.13 KG/M2 | DIASTOLIC BLOOD PRESSURE: 70 MMHG | HEART RATE: 74 BPM | HEIGHT: 67 IN | RESPIRATION RATE: 18 BRPM | TEMPERATURE: 97.7 F | OXYGEN SATURATION: 94 % | SYSTOLIC BLOOD PRESSURE: 100 MMHG

## 2021-10-12 DIAGNOSIS — F51.04 CHRONIC INSOMNIA: ICD-10-CM

## 2021-10-12 DIAGNOSIS — F51.01 PRIMARY INSOMNIA: Primary | ICD-10-CM

## 2021-10-12 DIAGNOSIS — F51.04 PSYCHOPHYSIOLOGIC INSOMNIA: ICD-10-CM

## 2021-10-12 PROCEDURE — 99204 OFFICE O/P NEW MOD 45 MIN: CPT | Performed by: PSYCHIATRY & NEUROLOGY

## 2021-10-12 ASSESSMENT — SLEEP AND FATIGUE QUESTIONNAIRES
HOW LIKELY ARE YOU TO NOD OFF OR FALL ASLEEP WHILE SITTING AND TALKING TO SOMEONE: 0
HOW LIKELY ARE YOU TO NOD OFF OR FALL ASLEEP WHILE SITTING AND READING: 0
HOW LIKELY ARE YOU TO NOD OFF OR FALL ASLEEP WHILE WATCHING TV: 0
HOW LIKELY ARE YOU TO NOD OFF OR FALL ASLEEP IN A CAR, WHILE STOPPED FOR A FEW MINUTES IN TRAFFIC: 0
HOW LIKELY ARE YOU TO NOD OFF OR FALL ASLEEP WHILE SITTING QUIETLY AFTER LUNCH WITHOUT ALCOHOL: 0
ESS TOTAL SCORE: 0
NECK CIRCUMFERENCE (INCHES): 12.5
HOW LIKELY ARE YOU TO NOD OFF OR FALL ASLEEP WHILE SITTING INACTIVE IN A PUBLIC PLACE: 0
HOW LIKELY ARE YOU TO NOD OFF OR FALL ASLEEP WHEN YOU ARE A PASSENGER IN A CAR FOR AN HOUR WITHOUT A BREAK: 0
HOW LIKELY ARE YOU TO NOD OFF OR FALL ASLEEP WHILE LYING DOWN TO REST IN THE AFTERNOON WHEN CIRCUMSTANCES PERMIT: 0

## 2021-10-12 ASSESSMENT — ENCOUNTER SYMPTOMS
ALLERGIC/IMMUNOLOGIC NEGATIVE: 1
CHOKING: 0
GASTROINTESTINAL NEGATIVE: 1
APNEA: 0
EYES NEGATIVE: 1

## 2021-10-12 NOTE — PROGRESS NOTES
MD NIRANJAN Duke Mai Board Certified in Sleep Medicine  Certified [facility] Sleep [facility]  Board Certified in Neurology [address]  [facility] [address] [address] ([address] [facility]  [address] [facility], [address]           [address]  [address] [facility] [address] [phone]    Subjective:     Patient ID: Jay Goetz is a 52 y.o. female. Chief Complaint   Patient presents with    Sleep Apnea       HPI:        Jay Goetz is a 52 y.o. female referred by Dr. Laura Delgado for a sleep evaluation. She complains of tossing and turning, difficulty falling asleep once awakened, insomnia but she denies snoring, snorting, choking, periods of not breathing, knees buckling with laughing, completely or partially paralyzed while falling asleep or waking up, excessive daytime sleepiness, take naps during the day, noisy environment, uncomfortable room temperature, uncomfortable bedding. Symptoms began several years ago, gradually worsening since that time. SLEEP SCHEDULE: Goes to bed around 10 PM in the weekdays and 10 PM in the weekends. It usually takes the patient 60 minutes to fall asleep (Melatonin 10 mg and Ambien 10 mg). The patient gets up 2 per night to go to the bathroom. The Patient finally gets up at 7 AM during the weekdays and 8 AM in the weekends. patient wakes up with sometimes morning headache. . the headache usually dull headache. The patient has restless sleep with frequent arousals in addition to the Patient has significant daytime sleepiness. The Patient scored Total score: 0 on [place] Sleepiness Scale ( more than 10 is indicative of daytime sleepiness)and 46 in fatigue scale ( more than 36 is indicative of daytime fatigue). The patient takes no naps. Previous evaluation and treatment has included- none.     Insomnia with Organizations:     Attends Club or Organization Meetings:     Marital Status:    Intimate Partner Violence:     Fear of Current or Ex-Partner:     Emotionally Abused:     Physically Abused:     Sexually Abused:        Prior to Admission medications    Medication Sig Start Date End Date Taking? Authorizing Provider   zolpidem (AMBIEN) 5 MG tablet Take 5 mg by mouth nightly as needed.    Yes Historical Provider, MD   levothyroxine (SYNTHROID) 150 MCG tablet TAKE 1 TABLET DAILY 21  Yes Keke Wolfe MD   citalopram (CELEXA) 20 MG tablet TAKE 1 TABLET DAILY 21  Yes Keke Wolfe MD   famotidine (PEPCID) 20 MG tablet Take 1 tablet by mouth 2 times daily 20  Yes Keke Wolfe MD   Cholecalciferol (VITAMIN D) 2000 units CAPS capsule Take by mouth daily   Yes Historical Provider, MD   estradiol (ESTRACE) 2 MG tablet Take 2 mg by mouth daily   Yes Historical Provider, MD       Allergies as of 10/12/2021 - Fully Reviewed 10/12/2021   Allergen Reaction Noted    Demerol  2011    Meperidine Rash 2013       Patient Active Problem List   Diagnosis    Celiac sprue    Hypothyroid    Hyperlipidemia    Insomnia    Anxiety    Carpal tunnel syndrome    Other tenosynovitis of hand and wrist       Past Medical History:   Diagnosis Date    Anemia     in the past    Celiac sprue 2003    Hypothyroidism     Insomnia        Past Surgical History:   Procedure Laterality Date    ABDOMINOPLASTY  10/2010    APPENDECTOMY      at the timeof the hysterectomy    CARPAL TUNNEL RELEASE Right 2013    CARPAL TUNNEL RELEASE Left 10/11/2013     SECTION      x2    COLONOSCOPY  10/27/2014    gallagher-moderate distal descending diverticulosis, repeat in 5 years    COLONOSCOPY N/A 2019    Gallagher- polyp x 1, repeat 5 years    HYSTERECTOMY      total hysterectomy (family history of ovarian cancer)    UPPER GASTROINTESTINAL ENDOSCOPY N/A 2020    EGD BIOPSY performed by Jadon Wilson MD at [address] [place] [address]  05/2021    Ro[place]-  urethral sling       Family History   Problem Relation Age of Onset    Mult Sclerosis Brother     Cancer Mother 61        Ovarian Cancer    Diabetes Mother     Colon Polyps Mother     Cancer Father         Prostate cancer    Colon Polyps Father     No Known Problems Sister     Other Maternal Aunt         Factor V Leiden, Sarcoid       Review of Systems   Constitutional: Positive for fatigue. HENT: Negative. Eyes: Negative. Respiratory: Negative for apnea and choking. Cardiovascular: Negative. Negative for leg swelling. Gastrointestinal: Negative. Endocrine: Negative. Genitourinary: Positive for frequency (2 nights\). Musculoskeletal: Negative. Allergic/Immunologic: Negative. Neurological: Positive for headaches. Hematological: Negative. Psychiatric/Behavioral: Positive for agitation and dysphoric mood. The patient is nervous/anxious. Objective:     Vitals:  Weight BMI Neck circumference    Wt Readings from Last 3 Encounters:   10/12/21 141 lb (64 kg)   09/20/21 145 lb 9.6 oz (66 kg)   11/20/20 145 lb (65.8 kg)    Body mass index is 22.08 kg/m². Neck circumference: 12.5     BP HR SaO2   BP Readings from Last 3 Encounters:   10/12/21 100/70   09/20/21 119/68   11/20/20 (!) 147/101    Pulse Readings from Last 3 Encounters:   10/12/21 74   09/20/21 98   11/20/20 65    SpO2 Readings from Last 3 Encounters:   10/12/21 94%   09/20/21 96%   11/20/20 97%        The mandibular molar Class :   [x]1 []2 []3      Mallampati I Airway Classification:   []1 []2 [x]3 []4        Physical Exam  Vitals and nursing note reviewed. Constitutional:       Appearance: Normal appearance. HENT:      Head: Atraumatic.       Nose: Nose normal.      Mouth/Throat:      Comments: Mallampati class 3, no retrognathia or hypognathia , normal airflow in bilateral nostrils, no septum deviation , crowded oropharynx with low soft palate, high arched hard palate,no tonsils enlargement. Eyes:      Extraocular Movements: Extraocular movements intact. Cardiovascular:      Rate and Rhythm: Normal rate and regular rhythm. Heart sounds: Normal heart sounds. Pulmonary:      Effort: Pulmonary effort is normal.      Breath sounds: Normal breath sounds. Musculoskeletal:         General: Normal range of motion. Cervical back: Normal range of motion and neck supple. Skin:     General: Skin is warm. Neurological:      General: No focal deficit present. Psychiatric:         Mood and Affect: Mood normal.         Assessment:      Diagnosis Orders   1. Primary insomnia     2. Chronic insomnia     3. Psychophysiologic insomnia       Plan:     Sleep compression 12 AM and 7 AM, no naps. Just stay in bed when you wake up at night, do not leave the bed. No more diet cock in the [employer]. Continue the same medicine for now. Sleep diary. No orders of the defined types were placed in this encounter. Return in about 4 weeks (around 11/9/2021) for insomnia.     Olvin Veloz MD  Medical Director - [facility]

## 2021-10-12 NOTE — PATIENT INSTRUCTIONS
Sleep compression 12 AM and 7 AM, no naps. Just stay in bed when you wake up at night, do not leave the bed. No more diet cock in the [employer] microcaps. Continue the same medicine for now at 11 PM.   Patient Education        Learning About Sleeping Well  What does sleeping well mean? Sleeping well means getting enough sleep. How much sleep is enough varies among people. The number of hours you sleep is not as important as how you feel when you wake up. If you do not feel refreshed, you probably need more sleep. Another sign of not getting enough sleep is feeling tired during the day. The average total nightly sleep time is 7½ to 8 hours. Healthy adults may need a little more or a little less than this. Why is getting enough sleep important? Getting enough quality sleep is a basic part of good health. When your sleep suffers, your mood and your thoughts can suffer too. You may find yourself feeling more grumpy or stressed. Not getting enough sleep also can lead to serious problems, including injury, accidents, anxiety, and depression. What might cause poor sleeping? Many things can cause sleep problems, including:  · Stress. Stress can be caused by fear about a single event, such as giving a speech. Or you may have ongoing stress, such as worry about work or school. · Depression, anxiety, and other mental or emotional conditions. · Changes in your sleep habits or surroundings. This includes changes that happen where you sleep, such as noise, light, or sleeping in a different bed. It also includes changes in your sleep pattern, such as having jet lag or working a late shift. · Health problems, such as pain, breathing problems, and restless legs syndrome. · Lack of regular exercise. How can you help yourself? Here are some tips that may help you sleep more soundly and wake up feeling more refreshed. Your sleeping area   · Use your bedroom only for sleeping and sex.  A bit of light reading may help you fall asleep. But if it doesn't, do your reading elsewhere in the house. Don't watch TV in bed. · Be sure your bed is big enough to stretch out comfortably, especially if you have a sleep partner. · Keep your bedroom quiet, dark, and cool. Use curtains, blinds, or a sleep mask to block out light. To block out noise, use earplugs, soothing music, or a \"white noise\" machine. Your evening and bedtime routine   · Create a relaxing bedtime routine. You might want to take a warm shower or bath, listen to soothing music, or drink a cup of noncaffeinated tea. · Go to bed at the same time every night. And get up at the same time every morning, even if you feel tired. What to avoid   · Limit caffeine (coffee, tea, caffeinated sodas) during the day, and don't have any for at least 4 to 6 hours before bedtime. · Don't drink alcohol before bedtime. Alcohol can cause you to wake up more often during the night. · Don't smoke or use tobacco, especially in the evening. Nicotine can keep you awake. · Don't take naps during the day, especially close to bedtime. · Don't lie in bed awake for too long. If you can't fall asleep, or if you wake up in the middle of the night and can't get back to sleep within 15 minutes or so, get out of bed and go to another room until you feel sleepy. · Don't take medicine right before bed that may keep you awake or make you feel hyper or energized. Your doctor can tell you if your medicine may do this and if you can take it earlier in the day. If you can't sleep   · Imagine yourself in a peaceful, pleasant scene. Focus on the details and feelings of being in a place that is relaxing. · Get up and do a quiet or boring activity until you feel sleepy. · Don't drink any liquids after 6 p.m. if you wake up often because you have to go to the bathroom. Where can you learn more? Go to https://martin.healthWakie/Budist. org and sign in to your [employer] account.  Enter H995 in the [address] Information box to learn more about \"Learning About Sleeping Well. \"     If you do not have an account, please click on the \"Sign Up Now\" link. Current as of: June 16, 2021               Content Version: 13.0  © 3116-3767 Healthwise, Incorporated. Care instructions adapted under license by [facility] ([facility]). If you have questions about a medical condition or this instruction, always ask your healthcare professional. [address] any warranty or liability for your use of this information.

## 2021-11-10 ENCOUNTER — OFFICE VISIT (OUTPATIENT)
Dept: SLEEP MEDICINE | Age: 51
End: 2021-11-10
Payer: COMMERCIAL

## 2021-11-10 VITALS
OXYGEN SATURATION: 97 % | DIASTOLIC BLOOD PRESSURE: 66 MMHG | WEIGHT: 143 LBS | SYSTOLIC BLOOD PRESSURE: 102 MMHG | BODY MASS INDEX: 22.44 KG/M2 | RESPIRATION RATE: 16 BRPM | TEMPERATURE: 97.7 F | HEIGHT: 67 IN | HEART RATE: 87 BPM

## 2021-11-10 DIAGNOSIS — F51.04 CHRONIC INSOMNIA: Primary | ICD-10-CM

## 2021-11-10 DIAGNOSIS — F51.01 PRIMARY INSOMNIA: ICD-10-CM

## 2021-11-10 PROCEDURE — 99214 OFFICE O/P EST MOD 30 MIN: CPT | Performed by: PSYCHIATRY & NEUROLOGY

## 2021-11-10 RX ORDER — ESZOPICLONE 2 MG/1
TABLET, FILM COATED ORAL
Qty: 30 TABLET | Refills: 5 | Status: SHIPPED | OUTPATIENT
Start: 2021-11-10 | End: 2021-11-30

## 2021-11-10 NOTE — PATIENT INSTRUCTIONS
Patient Education        Insomnia: Care Instructions  Overview     Insomnia is the inability to sleep well. Insomnia may make it hard for you to get to sleep, stay asleep, or sleep as long as you need to. This can make you tired and grouchy during the day. It can also make you forgetful, less effective at work, and unhappy. Insomnia can be linked to many things. These include health problems, medicines, and stressful events. Treatment may include treating problems that may be linked with your insomnia. Treatment also includes behavior and lifestyle changes. This may include cognitive-behavioral therapy for insomnia (CBT-I). CBT-I uses different ways to help you change your thoughts and behaviors that may interfere with sleep. Your doctor can recommend specific things you can try. Examples include doing relaxation exercises, keeping regular bedtimes and wake times, limiting alcohol, and making healthy sleep habits. Some people decide to take medicine for a while to help with sleep. Follow-up care is a key part of your treatment and safety. Be sure to make and go to all appointments, and call your doctor if you are having problems. It's also a good idea to know your test results and keep a list of the medicines you take. How can you care for yourself at home? Cognitive-behavioral therapy for insomnia (CBT-I)  · If your doctor recommends CBT-I, follow your treatment plan. Your doctor will give you instructions that are unique for you. · Your plan will likely include a few things that you can try at home. For example:  ? Try meditation or other relaxation techniques before you go to bed. ? Go to bed at the same time every night, and wake up at the same time every morning. Do not take naps during the day. ? Do not stay in bed awake for too long.  If you can't fall asleep, or if you wake up in the middle of the night and can't get back to sleep within about 15 to 20 minutes, get out of bed and go to another room until you feel sleepy. ? If watching the clock makes you anxious, turn it facing away from you so you cannot see the time. ? If you worry when you lie down, start a worry book. Well before bedtime, write down your worries, and then set the book and your concerns aside. Healthy sleep habits  · If your doctor recommends it, try making healthy sleep habits. For example:  ? Keep your bedroom quiet, dark, and cool. ? Do not have drinks with caffeine, such as coffee or black tea, for 8 hours before bed. ? Do not smoke or use other types of tobacco near bedtime. Nicotine is a stimulant and can keep you awake. ? Avoid drinking alcohol late in the evening, because it can cause you to wake in the middle of the night. ? Do not eat a big meal close to bedtime. If you are hungry, eat a light snack. ? Do not drink a lot of water close to bedtime, because the need to urinate may wake you up during the night. ? Do not read, watch TV, or use your phone in bed. Use the bed only for sleeping and sex. Medicine  · Be safe with medicines. Take your medicines exactly as prescribed. Call your doctor if you think you are having a problem with your medicine. · Talk with your doctor before you try an over-the-counter medicine, herbal product, or supplement to try to improve your sleep. Your doctor can recommend how much to take and when to take it. Make sure your doctor knows all of the medicines, vitamins, herbal products, and supplements you take. · You will get more details on the specific medicines your doctor prescribes. When should you call for help? Watch closely for changes in your health, and be sure to contact your doctor if:    · Your efforts to improve your sleep do not work.     · Your insomnia gets worse.     · You have been feeling down, depressed, or hopeless or have lost interest in things that you usually enjoy. Where can you learn more? Go to https://chfarazeb.RewardMe. org and sign in to your [employer] account. Enter P513 in the [facility] box to learn more about \"Insomnia: Care Instructions. \"     If you do not have an account, please click on the \"Sign Up Now\" link. Current as of: June 16, 2021               Content Version: 13.0  © 3694-0127 Healthwise, Incorporated. Care instructions adapted under license by [facility] ([facility]). If you have questions about a medical condition or this instruction, always ask your healthcare professional. [address] any warranty or liability for your use of this information.

## 2021-11-10 NOTE — PROGRESS NOTES
MD NIRANJAN Fonseca Board Certified in Sleep Medicine  Certified in [address] [place] [address] Certified in Neurology [address] [facility],  Ye[address]  [phone]   [address] [place] [address], [address]                      [address]  [address] [facility] [address] [phone]    Subjective:     Patient ID: Marylene Sickles is a 52 y.o. female. Chief Complaint   Patient presents with    Follow-up     4 week medication f/up        HPI:        Marylene Sickles is a 52 y.o. female was seen today as a follow for insomnia. Last visit plan was: ((Sleep compression 12 AM and 7 AM, no naps. Just stay in bed when you wake up at night, do not leave the bed. No more diet cock in the BBE. Continue the same medicine for now. , 10 mg Melatonin and Ambien 5 mg. Sleep diary.  ))  I reviewed her sleep diary, goes to bed at 11 PM, falls in sleep within 30 minutes, but even with the medicine she wakes up several times , finally she gets up at 6 AM    DOT/CDL - N/A        Previous Report(s)Reviewed: historical medical records         Social History     Socioeconomic History    Marital status:      Spouse name: Not on file    Number of children: 2    Years of education: Not on file    Highest education level: Not on file   Occupational History    Occupation: [employer]     Employer: [employer]     Comment:    Tobacco Use    Smoking status: Former Smoker     Quit date: 6/1/2006     Years since quitting: 15.4    Smokeless tobacco: Never Used   Vaping Use    Vaping Use: Never used   Substance and Sexual Activity    Alcohol use: Yes     Comment: weekends    Drug use: No    Sexual activity: Yes     Partners: Male   Other Topics Concern    Not on file   Social History Narrative    Not on file     Social Determinants of Health     Financial Resource Strain: Low Risk     Difficulty of Paying Living Expenses: Not hard at all   Food Insecurity: No Food Insecurity    Worried About Running Out of Food in the Last Year: Never true    Raquel of Food in the Last Year: Never true   Transportation Needs:     Lack of Transportation (Medical): Not on file    Lack of Transportation (Non-Medical): Not on file   Physical Activity:     Days of Exercise per Week: Not on file    Minutes of Exercise per Session: Not on file   Stress:     Feeling of Stress : Not on file   Social Connections:     Frequency of Communication with Friends and Family: Not on file    Frequency of Social Gatherings with Friends and Family: Not on file    Attends Mormonism Services: Not on file    Active Member of [address] [place] [address] or Organizations: Not on file    Attends Club or Organization Meetings: Not on file    Marital Status: Not on file   Intimate Partner Violence:     Fear of Current or Ex-Partner: Not on file    Emotionally Abused: Not on file    Physically Abused: Not on file    Sexually Abused: Not on file   Housing Stability:     Unable to Pay for Housing in the Last Year: Not on file    Number of [place] in the Last Year: Not on file    Unstable Housing in the Last Year: Not on file       Prior to Admission medications    Medication Sig Start Date End Date Taking?  Authorizing Provider   eszopiclone (LUNESTA) 2 MG TABS One tab QHS 11/10/21 11/10/25 Yes Lopez Mcginnis MD   levothyroxine (SYNTHROID) 150 MCG tablet TAKE 1 TABLET DAILY 10/18/21  Yes Vel Borges MD   citalopram (CELEXA) 20 MG tablet TAKE 1 TABLET DAILY 1/5/21  Yes Vel Borges MD   famotidine (PEPCID) 20 MG tablet Take 1 tablet by mouth 2 times daily 9/11/20  Yes Vel Borges MD   Cholecalciferol (VITAMIN D) 2000 units CAPS capsule Take by mouth daily   Yes Historical Provider, MD   estradiol (ESTRACE) 2 MG tablet Take 2 mg by mouth daily   Yes Historical Provider, MD       Allergies as of 11/10/2021 - Fully Reviewed 11/10/2021   Allergen Reaction Noted    Demerol  2011    Meperidine Rash 2013       Patient Active Problem List   Diagnosis    Celiac sprue    Hypothyroid    Hyperlipidemia    Insomnia    Anxiety    Carpal tunnel syndrome    Other tenosynovitis of hand and wrist       Past Medical History:   Diagnosis Date    Anemia     in the past    Celiac sprue 2003    Hypothyroidism     Insomnia        Past Surgical History:   Procedure Laterality Date    ABDOMINOPLASTY  10/2010    APPENDECTOMY      at the timeof the hysterectomy    CARPAL TUNNEL RELEASE Right 2013    CARPAL TUNNEL RELEASE Left 10/11/2013     SECTION      x2    COLONOSCOPY  10/27/2014    gallagher-moderate distal descending diverticulosis, repeat in 5 years    COLONOSCOPY N/A 2019    Gallagher- polyp x 1, repeat 5 years    HYSTERECTOMY      total hysterectomy (family history of ovarian cancer)    UPPER GASTROINTESTINAL ENDOSCOPY N/A 2020    EGD BIOPSY performed by Loree Rios MD at [address] [place] [address]  2021    [place]-  urethral sling       Family History   Problem Relation Age of Onset    Mult Sclerosis Brother     Cancer Mother 61        Ovarian Cancer    Diabetes Mother     Colon Polyps Mother     Cancer Father         Prostate cancer    Colon Polyps Father     No Known Problems Sister     Other Maternal Aunt         Factor V Leiden, Sarcoid       Review of Systems    Objective:     Vitals:  Weight BMI Neck circumference    Wt Readings from Last 3 Encounters:   11/10/21 143 lb (64.9 kg)   10/12/21 141 lb (64 kg)   21 145 lb 9.6 oz (66 kg)    Body mass index is 22.4 kg/m².        BP HR SaO2   BP Readings from Last 3 Encounters:   11/10/21 102/66   10/12/21 100/70   21 119/68    Pulse Readings from Last 3 Encounters:   11/10/21 87   10/12/21 74   21 98    SpO2 Readings from Last 3 Encounters:   11/10/21 97%   10/12/21 94%   09/20/21 96%        Themandibular molar Class :   [x]1 []2 []3      Mallampati I Airway Classification:   []1 []2 [x]3 []4      Physical Exam  Vitals and nursing note reviewed. Constitutional:       Appearance: Normal appearance. HENT:      Head: Atraumatic. Nose: Nose normal.      Mouth/Throat:      Mouth: Mucous membranes are moist.   Eyes:      Extraocular Movements: Extraocular movements intact. Cardiovascular:      Rate and Rhythm: Normal rate and regular rhythm. Pulmonary:      Effort: Pulmonary effort is normal.      Breath sounds: Normal breath sounds. Musculoskeletal:         General: Normal range of motion. Cervical back: Normal range of motion and neck supple. Skin:     General: Skin is warm. Neurological:      General: No focal deficit present. Psychiatric:         Mood and Affect: Mood normal.         :        Diagnosis Orders   1. Chronic insomnia  eszopiclone (LUNESTA) 2 MG TABS   2. Primary insomnia  eszopiclone (LUNESTA) 2 MG TABS     Plan: Will continue sleep compression between 11 PM and 6 AM  Will try Lunesta 2 mg   D/C the Ambien and Melatonin. No orders of the defined types were placed in this encounter. Return in about 6 months (around 5/10/2022) for insomnia.     Ryder Landaverde MD  Medical Director - [facility]

## 2021-11-30 DIAGNOSIS — F51.04 CHRONIC INSOMNIA: ICD-10-CM

## 2021-11-30 DIAGNOSIS — F51.04 CHRONIC INSOMNIA: Primary | ICD-10-CM

## 2021-11-30 RX ORDER — ESZOPICLONE 3 MG/1
3 TABLET, FILM COATED ORAL NIGHTLY PRN
Qty: 30 TABLET | Refills: 5 | Status: SHIPPED | OUTPATIENT
Start: 2021-11-30 | End: 2021-11-30 | Stop reason: SDUPTHER

## 2021-11-30 RX ORDER — ESZOPICLONE 3 MG/1
3 TABLET, FILM COATED ORAL NIGHTLY PRN
Qty: 30 TABLET | Refills: 0 | Status: SHIPPED | OUTPATIENT
Start: 2021-11-30 | End: 2022-05-11 | Stop reason: SDUPTHER

## 2021-11-30 RX ORDER — ESZOPICLONE 3 MG/1
3 TABLET, FILM COATED ORAL NIGHTLY PRN
Qty: 90 TABLET | Refills: 1 | Status: SHIPPED | OUTPATIENT
Start: 2021-11-30 | End: 2021-11-30 | Stop reason: SDUPTHER

## 2021-12-06 DIAGNOSIS — F51.04 CHRONIC INSOMNIA: Primary | ICD-10-CM

## 2021-12-06 RX ORDER — ESZOPICLONE 3 MG/1
3 TABLET, FILM COATED ORAL NIGHTLY PRN
Qty: 90 TABLET | Refills: 1 | Status: SHIPPED | OUTPATIENT
Start: 2021-12-06 | End: 2022-03-06

## 2022-01-14 ENCOUNTER — OFFICE VISIT (OUTPATIENT)
Dept: ENT CLINIC | Age: 52
End: 2022-01-14
Payer: COMMERCIAL

## 2022-01-14 VITALS
BODY MASS INDEX: 22.55 KG/M2 | WEIGHT: 144 LBS | HEART RATE: 112 BPM | DIASTOLIC BLOOD PRESSURE: 68 MMHG | SYSTOLIC BLOOD PRESSURE: 100 MMHG

## 2022-01-14 DIAGNOSIS — T17.208A FOREIGN BODY IN PHARYNX, INITIAL ENCOUNTER: ICD-10-CM

## 2022-01-14 DIAGNOSIS — R09.89 GLOBUS SENSATION: Primary | ICD-10-CM

## 2022-01-14 PROCEDURE — 31575 DIAGNOSTIC LARYNGOSCOPY: CPT | Performed by: OTOLARYNGOLOGY

## 2022-01-14 PROCEDURE — 99204 OFFICE O/P NEW MOD 45 MIN: CPT | Performed by: OTOLARYNGOLOGY

## 2022-01-14 NOTE — PROGRESS NOTES
Asiya      [facility] Record Number:  <[id number]>  Primary Care Physician:  Kylie Phillips MD  Date of Consultation: 1/14/2022    Chief Complaint: Something stuck in throat        Nazia Andrade is a(n) 54 y.o. female who presents for evaluation of the feeling of something stuck in her throat. The patient says that for about 2 weeks has had a feeling of something stuck in the right side of her throat. She mostly notices it when swallowing. She does not pinpoint a specific occurrence where she choked or anything when it began. She really does not have any other symptoms. No changes in her voice. No coughing up blood. She is not a smoker. She does have known bad acid reflux and had an EGD done in 2020. She is on Pepcid for this. REVIEW OF SYSTEMS  As above    PHYSICAL EXAM  GENERAL: No Acute Distress, Alert and Oriented, no Hoarseness, strong voice  EYES: EOMI, Anti-icteric  HENT:   Head: Normocephalic and atraumatic. Face:  Symmetric, facial nerve intact, no sinus tenderness  Right Ear: Normal external ear, normal external auditory canal, intact tympanic membrane with normal mobility and aerated middle ear  Left Ear: Normal external ear, normal external auditory canal, intact tympanic membrane with normal mobility and aerated middle ear  Mouth/Oral Cavity:  normal lips, Uvula is midline, no mucosal lesions,  Oropharynx/Larynx:  normal oropharynx  Nose:Normal external nasal appearance. Anterior rhinoscopy shows a midline septum  NECK: Normal range of motion, no thyromegaly, trachea is midline, no lymphadenopathy, no neck masses, no crepitus      PROCEDURE  Flexible laryngoscopy  Afrin and lidocaine were applied the bilateral nasal cavity. A flexible scope was passed to the right nasal cavity.   Nasopharynx was normal.  Base of tongue and vallecula were normal.  You could see hercarotid artery beating relatively medially in the posterolateral pharynx. I did not appreciate any foreign body. There was a bit of edema of the vocal cords themselves. ASSESSMENT/PLAN  1. Globus sensation  I see no evidence of a foreign body in the patient's pharynx. My guess is that she probably ate something that scratched her pharynx and she feels a foreign body there. Other potential etiologies include to her acid reflux. She does have a fairly medial carotid artery, but I have no idea why she was suddenly started feeling this and she is not describing any sort of pulsations. I think right now I would like to just give her a few more weeks to see if this goes away on its own. I will see her in 4 weeks. If it still occurring we may have to get a CAT scan or consider increasing her reflux medication. 2. Foreign body in pharynx, initial encounter  As above           I have performed a head and neck physical exam personally or was physically present during the key or critical portions of the service. This note was generated completely or in part utilizing Dragon dictation speech recognition software. Occasionally, words are mistranscribed and despite editing, the text may contain inaccuracies due to incorrect word recognition. If further clarification is needed please contact the office at [phone].

## 2022-02-11 ENCOUNTER — OFFICE VISIT (OUTPATIENT)
Dept: ENT CLINIC | Age: 52
End: 2022-02-11
Payer: COMMERCIAL

## 2022-02-11 VITALS
HEART RATE: 80 BPM | DIASTOLIC BLOOD PRESSURE: 76 MMHG | BODY MASS INDEX: 22.87 KG/M2 | SYSTOLIC BLOOD PRESSURE: 110 MMHG | WEIGHT: 146 LBS

## 2022-02-11 DIAGNOSIS — R09.89 GLOBUS SENSATION: Primary | ICD-10-CM

## 2022-02-11 DIAGNOSIS — K21.9 LARYNGOPHARYNGEAL REFLUX (LPR): ICD-10-CM

## 2022-02-11 PROCEDURE — 99213 OFFICE O/P EST LOW 20 MIN: CPT | Performed by: OTOLARYNGOLOGY

## 2022-02-11 NOTE — PROGRESS NOTES
[address] [facility] NECK SURGERY  Follow up      Patient Name: [address] [place] [address] Record Number:  <[id number]>  Primary Care Physician:  Mendy Godinez MD  Date of Consultation: 2/11/2022    Chief Complaint: Globus sensation        Interval History    Patient is following up with a feeling of something stuck in her throat. I saw her on January 14, 2022. It is only been present for short amount of time and I really did not see anything concerning so we decided to give it some time. She said that she still has a funny feeling in her throat. She is not developed any new symptoms. No trouble swallowing or changes in voice. She is getting a barium swallow ordered by gastroenterology soon. REVIEW OF SYSTEMS  As above    PHYSICAL EXAM  GENERAL: No Acute Distress, Alert and Oriented, no Hoarseness, strong voice  EYES: EOMI, Anti-icteric  HENT:   Head: Normocephalic and atraumatic. Face:  Symmetric, facial nerve intact, no sinus tenderness  Right Ear: Normal external ear, normal external auditory canal, intact tympanic membrane with normal mobility and aerated middle ear  Left Ear: Normal external ear, normal external auditory canal, intact tympanic membrane with normal mobility and aerated middle ear  Mouth/Oral Cavity:  normal lips, Uvula is midline, no mucosal lesions  Oropharynx/Larynx:  normal oropharynx,  Nose:Normal external nasal appearance. NECK: Normal range of motion, no thyromegaly, trachea is midline, no lymphadenopathy, no neck masses, no crepitus          ASSESSMENT/PLAN  1. Globus sensation  Still I am unsure the etiology. She is undergoing further evaluation with gastroenterology with a barium swallow. We discussed different options. We could just wait to see what the barium swallow shows. Another option is to go ahead and schedule her to see our speech therapist for further evaluation.   Also told her we can get a CT scan just to rule out any other etiologies. The patient says that she is okay waiting to see the results of the barium swallow before anything else. I told her the call the office and if the barium swallow is completely normal we could have her see our speech therapist and get a CT scan    2. Laryngopharyngeal reflux (LPR)  Being evaluated by gastroenterology             I have performed a head and neck physical exam personally or was physically present during the key or critical portions of the service. This note was generated completely or in part utilizing Dragon dictation speech recognition software. Occasionally, words are mistranscribed and despite editing, the text may contain inaccuracies due to incorrect word recognition. If further clarification is needed please contact the office at [phone].

## 2022-02-14 ENCOUNTER — HOSPITAL ENCOUNTER (OUTPATIENT)
Dept: GENERAL RADIOLOGY | Age: 52
Discharge: HOME | End: 2022-02-14
Payer: COMMERCIAL

## 2022-02-14 DIAGNOSIS — K20.90 ESOPHAGITIS: ICD-10-CM

## 2022-02-14 PROCEDURE — 74220 X-RAY XM ESOPHAGUS 1CNTRST: CPT

## 2022-05-11 ENCOUNTER — OFFICE VISIT (OUTPATIENT)
Dept: SLEEP MEDICINE | Age: 52
End: 2022-05-11
Payer: COMMERCIAL

## 2022-05-11 VITALS
WEIGHT: 147.6 LBS | HEART RATE: 82 BPM | HEIGHT: 67 IN | DIASTOLIC BLOOD PRESSURE: 70 MMHG | BODY MASS INDEX: 23.17 KG/M2 | RESPIRATION RATE: 20 BRPM | OXYGEN SATURATION: 98 % | TEMPERATURE: 97.3 F | SYSTOLIC BLOOD PRESSURE: 100 MMHG

## 2022-05-11 DIAGNOSIS — F51.8 SHORT SLEEPER SYNDROME: ICD-10-CM

## 2022-05-11 DIAGNOSIS — F51.01 PRIMARY INSOMNIA: Primary | ICD-10-CM

## 2022-05-11 DIAGNOSIS — F51.04 CHRONIC INSOMNIA: ICD-10-CM

## 2022-05-11 PROCEDURE — 99214 OFFICE O/P EST MOD 30 MIN: CPT | Performed by: PSYCHIATRY & NEUROLOGY

## 2022-05-11 RX ORDER — ESZOPICLONE 3 MG/1
3 TABLET, FILM COATED ORAL NIGHTLY PRN
Qty: 90 TABLET | Refills: 3 | Status: SHIPPED | OUTPATIENT
Start: 2022-05-11 | End: 2026-05-11

## 2022-05-11 ASSESSMENT — SLEEP AND FATIGUE QUESTIONNAIRES
HOW LIKELY ARE YOU TO NOD OFF OR FALL ASLEEP WHILE SITTING AND TALKING TO SOMEONE: 0
HOW LIKELY ARE YOU TO NOD OFF OR FALL ASLEEP WHILE SITTING INACTIVE IN A PUBLIC PLACE: 0
HOW LIKELY ARE YOU TO NOD OFF OR FALL ASLEEP WHILE WATCHING TV: 0
HOW LIKELY ARE YOU TO NOD OFF OR FALL ASLEEP WHILE LYING DOWN TO REST IN THE AFTERNOON WHEN CIRCUMSTANCES PERMIT: 0
HOW LIKELY ARE YOU TO NOD OFF OR FALL ASLEEP WHILE SITTING AND READING: 0
HOW LIKELY ARE YOU TO NOD OFF OR FALL ASLEEP WHEN YOU ARE A PASSENGER IN A CAR FOR AN HOUR WITHOUT A BREAK: 0
ESS TOTAL SCORE: 0
HOW LIKELY ARE YOU TO NOD OFF OR FALL ASLEEP IN A CAR, WHILE STOPPED FOR A FEW MINUTES IN TRAFFIC: 0
HOW LIKELY ARE YOU TO NOD OFF OR FALL ASLEEP WHILE SITTING QUIETLY AFTER LUNCH WITHOUT ALCOHOL: 0

## 2022-05-11 NOTE — PROGRESS NOTES
MD NIRANJAN Whiteside Board Certified in Sleep Medicine  Certified in [address] [place] [address] Certified in Neurology Jennyfer Solano [address]  [phone]   [address] [place] [address], [address] [place] [address]                      [facility]  [address] [address]  [phone]    Subjective:     Patient ID: Kathleen Hay is a 54 y.o. female. Chief Complaint   Patient presents with    Follow-up    Insomnia       HPI:        Kathleen Hay is a 54 y.o. female was seen today as a follow for insomnia. The plan last visit:  continue sleep compression between 11 PM and 6 AM  Now Lunesta 3 mg QHS, wakes up 3-4 times and sometimes for 15 minutes, but feels fine during the daytime    Patient improved regarding daytime sleepiness and fatigue, wakes up refreshed in the morning. The Patient scored Total score: 0 on [place] Sleepiness Scale ( more than 10 is indicative of daytime sleepiness)     DOT/CDL - N/A        Previous Report(s)Reviewed: historical medical records         Social History     Socioeconomic History    Marital status:      Spouse name: Not on file    Number of children: 2    Years of education: Not on file    Highest education level: Not on file   Occupational History    Occupation: [employer]     Employer: [employer]     Comment:    Tobacco Use    Smoking status: Former Smoker     Packs/day: 1.00     Years: 15.00     Pack years: 15.00     Types: Cigarettes     Quit date: 6/1/2006     Years since quitting: 15.9    Smokeless tobacco: Never Used   Vaping Use    Vaping Use: Never used   Substance and Sexual Activity    Alcohol use:  Yes     Alcohol/week: 4.0 standard drinks     Types: 4 Cans of beer per week     Comment: weekends    Drug use: No    Sexual activity: Yes     Partners: Male   Other Topics Concern    Not on file   Social History Narrative    Not on file     Social Determinants of Health     Financial Resource Strain: Low Risk     Difficulty of Paying Living Expenses: Not hard at all   Food Insecurity: No Food Insecurity    Worried About Running Out of Food in the Last Year: Never true    [address] in the Last Year: Never true   Transportation Needs:     Lack of Transportation (Medical): Not on file    Lack of Transportation (Non-Medical): Not on file   Physical Activity:     Days of Exercise per Week: Not on file    Minutes of Exercise per Session: Not on file   Stress:     Feeling of Stress : Not on file   Social Connections:     Frequency of Communication with Friends and Family: Not on file    Frequency of Social Gatherings with Friends and Family: Not on file    Attends [employer] Services: Not on file    Active Member of [address] [place] [address] [employer] or Organizations: Not on file    Attends Club or Organization Meetings: Not on file    Marital Status: Not on file   Intimate Partner Violence:     Fear of Current or Ex-Partner: Not on file    Emotionally Abused: Not on file    Physically Abused: Not on file    Sexually Abused: Not on file   Housing Stability:     Unable to Pay for Housing in the Last Year: Not on file    Number of [place] in the Last Year: Not on file    Unstable Housing in the Last Year: Not on file       Prior to Admission medications    Medication Sig Start Date End Date Taking? Authorizing Provider   eszopiclone (ESZOPICLONE) 3 MG TABS Take 1 tablet by mouth nightly as needed (one tab QHS PRN).  5/11/22 5/11/26 Yes Evelio Crespo MD   citalopram (CELEXA) 20 MG tablet TAKE 1 TABLET DAILY 1/2/22  Yes Pamela Iglesias MD   levothyroxine (SYNTHROID) 150 MCG tablet TAKE 1 TABLET DAILY 10/18/21  Yes Pamela Iglesias MD   famotidine (PEPCID) 20 MG tablet Take 1 tablet by mouth 2 times daily 9/11/20  Yes Gosia Casanova MD   Cholecalciferol (VITAMIN D) 2000 units CAPS capsule Take by mouth daily   Yes Historical Provider, MD   estradiol (ESTRACE) 2 MG tablet Take 2 mg by mouth daily   Yes Historical Provider, MD       Allergies as of 2022 - Fully Reviewed 2022   Allergen Reaction Noted    Demerol  2011    Meperidine Rash 2013       Patient Active Problem List   Diagnosis    Celiac sprue    Hypothyroid    Hyperlipidemia    Insomnia    Anxiety    Carpal tunnel syndrome    Other tenosynovitis of hand and wrist       Past Medical History:   Diagnosis Date    Anemia     in the past    Celiac sprue 2003    Hypothyroidism     Insomnia        Past Surgical History:   Procedure Laterality Date    ABDOMINOPLASTY  10/2010    APPENDECTOMY      at the timeof the hysterectomy    CARPAL TUNNEL RELEASE Right 2013    CARPAL TUNNEL RELEASE Left 10/11/2013     SECTION      x2    COLONOSCOPY  10/27/2014    gallagher-moderate distal descending diverticulosis, repeat in 5 years    COLONOSCOPY N/A 2019    Gallagher- polyp x 1, repeat 5 years    HYSTERECTOMY      total hysterectomy (family history of ovarian cancer)    UPPER GASTROINTESTINAL ENDOSCOPY N/A 2020    EGD BIOPSY performed by Renee Sanches MD at [address] [place] [address]  2021    [place]-  urethral sling       Family History   Problem Relation Age of Onset    Mult Sclerosis Brother     Cancer Mother 61        Ovarian Cancer    Diabetes Mother     Colon Polyps Mother     Cancer Father         Prostate cancer    Colon Polyps Father     No Known Problems Sister     Other Maternal Aunt         Factor V Leiden, Sarcoid       Review of Systems    Objective:     Vitals:  Weight BMI Neck circumference    Wt Readings from Last 3 Encounters:   22 147 lb 9.6 oz (67 kg)   22 146 lb (66.2 kg)   22 144 lb (65.3 kg)    Body mass index is 23.12 kg/m².        BP HR SaO2   BP Readings from Last 3 Encounters:   05/11/22 100/70   02/11/22 110/76   01/14/22 100/68    Pulse Readings from Last 3 Encounters:   05/11/22 82   02/11/22 80   01/14/22 112    SpO2 Readings from Last 3 Encounters:   05/11/22 98%   11/10/21 97%   10/12/21 94%        Themandibular molar Class :   [x]1 []2 []3      Mallampati I Airway Classification:   []1 []2 [x]3 []4      Physical Exam  Vitals and nursing note reviewed. Constitutional:       Appearance: Normal appearance. HENT:      Head: Atraumatic. Cardiovascular:      Rate and Rhythm: Normal rate and regular rhythm. Pulmonary:      Effort: Pulmonary effort is normal.      Breath sounds: Normal breath sounds. Neurological:      Mental Status: Mental status is at baseline.         :        Diagnosis Orders   1. Primary insomnia  eszopiclone (ESZOPICLONE) 3 MG TABS   2. Chronic insomnia  eszopiclone (ESZOPICLONE) 3 MG TABS   3. Short sleeper syndrome  eszopiclone (ESZOPICLONE) 3 MG TABS     Plan:     Continue the Lunesta 3 mg. Change the sleep [id number] PM till 6 AM.       No orders of the defined types were placed in this encounter. Return in about 6 months (around 11/11/2022) for insomnia.     Gladis Jones MD  Medical Director - [facility]

## 2022-05-11 NOTE — PATIENT INSTRUCTIONS
Patient Education        Insomnia: Care Instructions  Overview     Insomnia is the inability to sleep well. Insomnia may make it hard for you to get to sleep, stay asleep, or sleep as long as you need to. This can make you tired and grouchy during the day. It can also make you forgetful, lesseffective at work, and unhappy. Insomnia can be linked to many things. These include health problems,medicines, and stressful events. Treatment may include treating problems that may be linked with your insomnia. Treatment also includes behavior and lifestyle changes. This may include cognitive-behavioral therapy for insomnia (CBT-I). CBT-I uses different ways to help you change your thoughts and behaviors that may interfere with sleep. Your doctor can recommend specific things you can try. Examples include doing relaxation exercises, keeping regular bedtimes and wake times, limiting alcohol, and making healthy sleep habits. Some people decide to take medicinefor a while to help with sleep. Follow-up care is a key part of your treatment and safety. Be sure to make and go to all appointments, and call your doctor if you are having problems. It's also a good idea to know your test results and keep alist of the medicines you take. How can you care for yourself at home? Cognitive-behavioral therapy for insomnia (CBT-I)   If your doctor recommends CBT-I, follow your treatment plan. Your doctor will give you instructions that are unique for you.  Your plan will likely include a few things that you can try at home. For example:  ? Try meditation or other relaxation techniques before you go to bed. ? Go to bed at the same time every night, and wake up at the same time every morning. Do not take naps during the day. ? Do not stay in bed awake for too long.  If you can't fall asleep, or if you wake up in the middle of the night and can't get back to sleep within about 15 to 20 minutes, get out of bed and go to another room until you feel sleepy. ? If watching the clock makes you anxious, turn it facing away from you so you cannot see the time. ? If you worry when you lie down, start a worry book. Well before bedtime, write down your worries, and then set the book and your concerns aside. Healthy sleep habits   If your doctor recommends it, try making healthy sleep habits. For example:  ? Keep your bedroom quiet, dark, and cool. ? Do not have drinks with caffeine, such as coffee or black tea, for 8 hours before bed. ? Do not smoke or use other types of tobacco near bedtime. Nicotine is a stimulant and can keep you awake. ? Avoid drinking alcohol late in the evening, because it can cause you to wake in the middle of the night. ? Do not eat a big meal close to bedtime. If you are hungry, eat a light snack. ? Do not drink a lot of water close to bedtime, because the need to urinate may wake you up during the night. ? Do not read, watch TV, or use your phone in bed. Use the bed only for sleeping and sex. Medicine   Be safe with medicines. Take your medicines exactly as prescribed. Call your doctor if you think you are having a problem with your medicine.  Talk with your doctor before you try an over-the-counter medicine, herbal product, or supplement to try to improve your sleep. Your doctor can recommend how much to take and when to take it. Make sure your doctor knows all of the medicines, vitamins, herbal products, and supplements you take.  You will get more details on the specific medicines your doctor prescribes. When should you call for help? Watch closely for changes in your health, and be sure to contact your doctor if:     Your efforts to improve your sleep do not work.      Your insomnia gets worse.      You have been feeling down, depressed, or hopeless or have lost interest in things that you usually enjoy. Where can you learn more? Go to [email or web address].[email or web address] org and sign in to your [employer] account. Enter P513 in the [facility] box to learn more about \"Insomnia: Care Instructions. \"     If you do not have an account, please click on the \"Sign Up Now\" link. Current as of: June 16, 2021               Content Version: 13.2  © 3845-5090 Healthwise, Incorporated. Care instructions adapted under license by [facility] ([facility]). If you have questions about a medical condition or this instruction, always ask your healthcare professional. [address] any warranty or liability for your use of this information.

## 2022-08-30 SDOH — HEALTH STABILITY: PHYSICAL HEALTH: ON AVERAGE, HOW MANY MINUTES DO YOU ENGAGE IN EXERCISE AT THIS LEVEL?: 30 MIN

## 2022-08-30 SDOH — HEALTH STABILITY: PHYSICAL HEALTH: ON AVERAGE, HOW MANY DAYS PER WEEK DO YOU ENGAGE IN MODERATE TO STRENUOUS EXERCISE (LIKE A BRISK WALK)?: 3 DAYS

## 2022-09-02 ENCOUNTER — OFFICE VISIT (OUTPATIENT)
Dept: ORTHOPEDIC SURGERY | Age: 52
End: 2022-09-02
Payer: COMMERCIAL

## 2022-09-02 VITALS — WEIGHT: 146 LBS | HEIGHT: 67 IN | BODY MASS INDEX: 22.91 KG/M2 | RESPIRATION RATE: 16 BRPM

## 2022-09-02 DIAGNOSIS — R20.0 HAND NUMBNESS: Primary | ICD-10-CM

## 2022-09-02 PROCEDURE — 99203 OFFICE O/P NEW LOW 30 MIN: CPT | Performed by: ORTHOPAEDIC SURGERY

## 2022-09-02 NOTE — Clinical Note
Dear  Cherylene Biles, MD,  Thank you very much for your referral or Ms. Nik Suarez to me for evaluation and treatment of her Hand & Wrist condition. I appreciate your confidence in me and thank you for allowing me the opportunity to care for your patients. If I can be of any further assistance to you on this or any other patient, please do not hesitate to contact me. Sincerely,  Pedro Phelan.  Mnotse Altamirano MD

## 2022-09-04 NOTE — PROGRESS NOTES
Ms. Erinn Singh is a 54 y.o. right handed woman  who is seen today in Hand Surgical Consultation at the request of Eileen Miner MD.    She presents today regarding Right symptoms which have been present for approximately 1 months. A history of antecedent trauma or injury is Absent. She reports symptoms to include moderate numbness & tingling in the Thumb. Hand symptoms do not  awaken her from sleep. She reports no pain located in the dorsal and palmar left wrist. Symptoms show no change over time. Previous treatment has included no prior treatments used. She does not claim relation of her symptoms to her required work activities. She has not undergone electrodiagnostic testing. I have today reviewed with Erinn Singh the clinically relevant, past medical history, medications, allergies,  family history, social history, and Review Of Systems & I have documented any details relevant to today's presenting complaints in my history above. Ms. Shefali Avila's self-reported past medical history, medications, allergies,  family history, social history, and Review Of Systems have been scanned into the chart under the \"Media\" tab. Physical Exam:  Ms. Rose Severino most recent vitals:  Vitals  Resp: 16  Height: 5' 7\" (170.2 cm)  Weight: 146 lb (66.2 kg)    She is well nourished, oriented to person, place & time. She demonstrates appropriate mood and affect as well as normal gait and station. Skin: Normal in appearance, Normal Color, and Free of Lesions Bilaterally   Digital range of motion is Full and equal bilaterally otherwise normal bilaterally  Wrist range of motion is Full bilaterally  There is no evidence of gross joint instability bilaterally. Sensation is subjectively \" numb\" and tingling in the Thumb on the Right, normal on the Left and objectively present in the same distribution bilaterally.   Vascular examination reveals normal and good capillary refill bilaterally  Swelling is absent in the distal volar forearm on the Right, normal on the Left  Muscular strength is clinically appropriate bilaterally. Examination for Carpal Tunnel Syndrome shows Carpal Tunnel Compression Test to be Negative on the right & Negative on the left. The patient displays mild baseline symptoms to potentially confound the exam.  The thenar musculature is not atrophied & weakened. Cervical Spine: Active Range of Motion  is Normal without pain at extremes. Lateral bending does not reproduce symptoms in the symptomatic extremity, Maximal rotation does not reproduce symptoms in the symptomatic extremity. Impression:  Ms. Marylene Sickles may have Carpal Tunnel Syndrome, which is currently exacerbated, and presents requesting further treatment. Plan:  I have had a thorough discussion with Ms. Marylene Sickles regarding the treatment options available for her initially presenting right Numb Thumb, which is causing her significant symptoms and difficulty. I have outlined for Ms. Marylene Sickles the risk, benefits and consequences of the various treatment modalities, including a reasonable expectation for the long term success of each. We have discussed the likelihood that further, more aggressive treatment could  be required for her current presenting condition. Based upon our current discussion and a reasonable understating of the options available to her, Ms. Marylene Sickles has selected to proceed with a conservative plan of treatment consisting of: the use of protective splints, activity modification, and the judicious use of over-the-counter anti-inflammatory medications if allowed by her primary care physician. An appropriately sized Removable Carpal Tunnel Splint was offered and declined. Instructions were given regarding splint use and wear as well as suggestions for use of the other modalities were discussed.   I have clearly explained to her that the above outlined treatment plan should not be expected to 'cure' her symptoms, but we are rather treating the symptoms with which she presents. Ms. Corazon Santiago  voiced an appropriate understanding of our discussion, the options available to her, and of the expectations of her selected  treatment. I will ask Ms. Corazon Santiago. to undergo electrodiagnostic studies of the symptomatic left side. I will ask that she schedule a follow-up appointment with me to review the results of this study after it has been completed. I have also discussed with Ms. Corazon Santiago  the other treatment options available to her  for this condition. We have today selected to proceed with conservative management. She and I have agreed that if our current course of conservative treatment does not prove to be effective over the short term future, that she will schedule a follow-up appointment to discuss and select an alternate course of therapy including possibly injection or surgical treatment. As I do not today find an etiology for her symptoms in my evaluation of her hand & wrist, I will refer Ms. Corazon Santiago for evaluation of her cervical spine to see if there is an ongoing problem at that level which may explain her presenting complaints. Ms. Corazon Santiago has been given a full verbal list of instructions and precautions related to her present condition. I have asked her to followup with me in the office at the prescribed time. She is also specifically requested to call or return to the office sooner if her symptoms change or worsen prior to the next scheduled appointment.

## 2022-09-04 NOTE — PATIENT INSTRUCTIONS
Thank you for choosing [facility]) Physicians for your Hand and Upper Extremity needs. If we can be of any further assistance to you, please do not hesitate to contact us.     Office Phone Number:  [phone]  or  [phone]

## 2022-09-06 ENCOUNTER — TELEPHONE (OUTPATIENT)
Dept: ORTHOPEDIC SURGERY | Age: 52
End: 2022-09-06

## 2022-09-06 NOTE — TELEPHONE ENCOUNTER
General Question     Subject: PATIENT SATES SHE IS UNABLE TO GET SCHEDULED FOR EMG TEST. PLEASE ADVISE.    Patient Nehemiah Tabitha  Contact Number: [phone]

## 2022-09-06 NOTE — TELEPHONE ENCOUNTER
Spoke with the patient she said that she has been calling [facility] to gt schedule for an EMG but they keep bouncing her around due to staffing shortages. I told her to try Dr. Carolina Starr he is usually pretty quick with getting people in.

## 2022-09-13 ENCOUNTER — PROCEDURE VISIT (OUTPATIENT)
Dept: NEUROLOGY | Age: 52
End: 2022-09-13
Payer: COMMERCIAL

## 2022-09-13 DIAGNOSIS — R20.0 LEFT ARM NUMBNESS: Primary | ICD-10-CM

## 2022-09-13 PROCEDURE — 95909 NRV CNDJ TST 5-6 STUDIES: CPT | Performed by: PSYCHIATRY & NEUROLOGY

## 2022-09-13 PROCEDURE — 95886 MUSC TEST DONE W/N TEST COMP: CPT | Performed by: PSYCHIATRY & NEUROLOGY

## 2022-09-13 NOTE — PROGRESS NOTES
Jono Kelley M.D. [facility]) [facility]  Board Certified in [address], [address] [place] [address]    EMG / NERVE CONDUCTION STUDY      PATIENT:  Barb Simeon       DATE OF EM22     YOB: 1970       REASON FOR EMG:   Left arm pain and numbness, rule out carpal tunnel syndrome      REFERRING PHYSICIAN:  Deep Lemus MD  [address]  [address] [place] [address]     SUMMARY:   The left median motor and sensory nerve studies were normal.  The left ulnar motor nerve study had a slowing of conduction velocity across the elbow. The left ulnar and radial sensory nerve studies were normal.  Needle EMG of several muscles in the left upper extremity was normal.      CLINICAL DIAGNOSIS:  Carpal tunnel syndrome        EMG RESULTS:     This patient has a mild to moderate left ulnar nerve lesion at the elbow. ?  Probably incidental and asymptomatic finding. There is no electrophysiological evidence for median nerve entrapment/carpal tunnel syndrome in this study. There is also no electrophysiological evidence for cervical radiculopathy.        ---------------------------------------------  Jono Kelley M.D.   Electromyographer / Neurologist

## 2022-09-19 ENCOUNTER — TELEPHONE (OUTPATIENT)
Dept: ORTHOPEDIC SURGERY | Age: 52
End: 2022-09-19

## 2022-09-19 NOTE — TELEPHONE ENCOUNTER
General Question     Subject: EMG RESULTS   Patient and /or Facility Request: Irwin Rios  Contact Number:  [phone]    PATIENT WOULD LIKE A CALL BACK REGARDING EMG RESULTS. PLEASE CALL PATIENT BACK AT THE NUMBER ABOVE

## 2022-10-18 ENCOUNTER — OFFICE VISIT (OUTPATIENT)
Dept: ORTHOPEDIC SURGERY | Age: 52
End: 2022-10-18
Payer: COMMERCIAL

## 2022-10-18 DIAGNOSIS — M54.2 CERVICAL PAIN (NECK): Primary | ICD-10-CM

## 2022-10-18 DIAGNOSIS — M47.812 CERVICAL SPONDYLOSIS: ICD-10-CM

## 2022-10-18 PROCEDURE — 99213 OFFICE O/P EST LOW 20 MIN: CPT | Performed by: ORTHOPAEDIC SURGERY

## 2022-10-18 NOTE — PROGRESS NOTES
New Patient: CERVICAL SPINE    Referring Provider:  Luis Carranza MD    CHIEF COMPLAINT:  No chief complaint on file. HISTORY OF PRESENT ILLNESS:   Ms. Diane Smith is a pleasant 54 y.o. old female currently referred by Dr. Erik Adam for evaluation of left thumb numbness. Her symptoms began insidiously about 3 months ago. Those have improved since that time. She has 3/10 neck pain and 5/10 low back pain. She denies other upper or lower EXTR radicular symptoms, loss of fine motor control and gait abnormality. Current/Past Treatment:   Physical Therapy: No  Chiropractic: No  Injection: No  Medications: None    Past Medical History:   Past Medical History:   Diagnosis Date    Anemia     in the past    Celiac sprue 2003    Esophagitis 2020    Gallagher-  seen by EGD    Hypothyroidism     Insomnia       Past Surgical History:     Past Surgical History:   Procedure Laterality Date    ABDOMINOPLASTY  10/2010    APPENDECTOMY      at the timeof the hysterectomy    CARPAL TUNNEL RELEASE Right 2013    CARPAL TUNNEL RELEASE Left 10/11/2013     SECTION      x2    COLONOSCOPY  10/27/2014    gallagher-moderate distal descending diverticulosis, repeat in 5 years    COLONOSCOPY N/A 2019    Gallagher- polyp x 1, repeat 5 years    HYSTERECTOMY (CERVIX STATUS UNKNOWN)      total hysterectomy (family history of ovarian cancer)    UPPER GASTROINTESTINAL ENDOSCOPY N/A 2020    [facility]agher    URETHRAL SURGERY  2021    [facility]-  urethral sling     Current Medications:     Current Outpatient Medications:     levothyroxine (SYNTHROID) 150 MCG tablet, TAKE 1 TABLET DAILY, Disp: 90 tablet, Rfl: 3    famciclovir (FAMVIR) 500 MG tablet, Take 3 tablets in a single dose once at the onset of each fever blister, Disp: 15 tablet, Rfl: 2    eszopiclone (ESZOPICLONE) 3 MG TABS, Take 1 tablet by mouth nightly as needed (one tab QHS PRN). , Disp: 90 tablet, Rfl: 3    citalopram (CELEXA) 20 MG tablet, TAKE 1 TABLET DAILY, Disp: 90 tablet, Rfl: 3    famotidine (PEPCID) 20 MG tablet, Take 1 tablet by mouth 2 times daily, Disp: 60 tablet, Rfl: 5    Cholecalciferol (VITAMIN D) 2000 units CAPS capsule, Take by mouth daily, Disp: , Rfl:     estradiol (ESTRACE) 2 MG tablet, Take 2 mg by mouth daily, Disp: , Rfl:   Allergies:  Demerol and Meperidine  Social History:    reports that she quit smoking about 16 years ago. Her smoking use included cigarettes. She has a 15.00 pack-year smoking history. She has never used smokeless tobacco. She reports current alcohol use of about 4.0 standard drinks per week. She reports that she does not use drugs. Family History:   Family History   Problem Relation Age of Onset    Mult Sclerosis Brother     Cancer Mother 61        Ovarian Cancer    Diabetes Mother     Colon Polyps Mother     Cancer Father         Prostate cancer    Colon Polyps Father     No Known Problems Sister     Other Maternal Aunt         Factor V Leiden, Sarcoid       REVIEW OF SYSTEMS: Full ROS noted & scanned   CONSTITUTIONAL: Denies unexplained weight loss, fevers, chills or fatigue  NEUROLOGICAL: Denies unsteady gait or progressive weakness  MUSCULOSKELETAL: Denies joint swelling or redness  PSYCHOLOGICAL: Denies anxiety, depression   SKIN: Denies skin changes, delayed healing, rash, itching   HEMATOLOGIC: Denies easy bleeding or bruising  ENDOCRINE: Denies excessive thirst, urination, heat/cold  RESPIRATORY: Denies current dyspnea, cough  GI: Denies nausea, vomiting, diarrhea   : Denies bowel or bladder issues       PHYSICAL EXAM:    Vitals: There were no vitals taken for this visit. GENERAL EXAM:  General Apparence: Patient is adequately groomed with no evidence of malnutrition. Orientation: The patient is oriented to time, place and person. Mood & Affect:The patient's mood and affect are appropriate   Vascular: Examination reveals no swelling tenderness in upper or lower extremities.  Good capillary refill  Lymphatic: The lymphatic examination bilaterally reveals all areas to be without enlargement or induration  Sensation: Sensation is intact without deficit  Coordination/Balance: Good coordination     CERVICAL EXAMINATION:  Inspection: Local inspection shows no step-off or bruising. Cervical alignment is normal.     Palpation: No evidence of tenderness at the midline, and trapezius. Paraspinal tenderness is present. There is no step-off or paraspinal spasm. Range of Motion: Cervical flexion, extension, and rotation are mildly reduced with pain. Strength: 5/5 bilateral upper extremities   Special Tests:     Victoria's negative bilaterally. Cubital and Carpal tunnel Tinel's negative bilaterally. Skin:There are no rashes, ulcerations or lesions in right & left upper extremities. Reflexes: Bilaterally triceps, biceps and brachioradialis are 2+. Clonus absent bilaterally at the feet. Gait & station: normal, patient ambulates without assistance     Additional Examinations:       RIGHT UPPER EXTREMITY:  Inspection/examination of the right upper extremity does not show any tenderness, deformity or injury. Range of motion is unremarkable. There is no gross instability. There are no rashes, ulcerations or lesions. Strength and tone are normal.  LEFT UPPER EXTREMITY: Inspection/examination of the left upper extremity does not show any tenderness, deformity or injury. Range of motion is unremarkable. There is no gross instability. There are no rashes, ulcerations or lesions. Strength and tone are normal.    Diagnostic Testing:  Reviewed and EMG of her left upper extremity from 9/13/2022 in the office today. This shows mild to moderate ulnar nerve lesion at the elbow and no evidence of radiculopathy. Reviewed AP and lateral x-rays of her cervical spine were obtained in the office today. Those show mild spondylosis C5-C6.     Impression:   Cervical spondylosis    Plan:    Discussed treatment options including observation, physical therapy, medications, epidural injections and additional imaging. She would like to proceed with observation.

## (undated) DEVICE — FORCEPS BX 240CM 2.4MM L NDL RAD JAW 4 M00513334